# Patient Record
Sex: MALE | Race: WHITE | NOT HISPANIC OR LATINO | Employment: UNEMPLOYED | URBAN - METROPOLITAN AREA
[De-identification: names, ages, dates, MRNs, and addresses within clinical notes are randomized per-mention and may not be internally consistent; named-entity substitution may affect disease eponyms.]

---

## 2017-10-20 ENCOUNTER — APPOINTMENT (EMERGENCY)
Dept: CT IMAGING | Facility: HOSPITAL | Age: 47
DRG: 422 | End: 2017-10-20
Payer: COMMERCIAL

## 2017-10-20 ENCOUNTER — APPOINTMENT (EMERGENCY)
Dept: RADIOLOGY | Facility: HOSPITAL | Age: 47
DRG: 422 | End: 2017-10-20
Payer: COMMERCIAL

## 2017-10-20 ENCOUNTER — HOSPITAL ENCOUNTER (INPATIENT)
Facility: HOSPITAL | Age: 47
LOS: 4 days | Discharge: HOME | DRG: 422 | End: 2017-10-24
Attending: EMERGENCY MEDICINE | Admitting: ANESTHESIOLOGY
Payer: COMMERCIAL

## 2017-10-20 DIAGNOSIS — T56.894A LITHIUM TOXICITY, UNDETERMINED INTENT, INITIAL ENCOUNTER: ICD-10-CM

## 2017-10-20 DIAGNOSIS — E87.6 HYPOKALEMIA: ICD-10-CM

## 2017-10-20 DIAGNOSIS — E86.0 DEHYDRATION: ICD-10-CM

## 2017-10-20 DIAGNOSIS — E87.1 HYPONATREMIA: ICD-10-CM

## 2017-10-20 DIAGNOSIS — N17.9 AKI (ACUTE KIDNEY INJURY) (HCC): Primary | ICD-10-CM

## 2017-10-20 DIAGNOSIS — K52.9 GASTROENTERITIS: ICD-10-CM

## 2017-10-20 PROBLEM — E11.9 TYPE 2 DIABETES MELLITUS (HCC): Status: ACTIVE | Noted: 2017-10-20

## 2017-10-20 LAB
ABO GROUP BLD: NORMAL
ALBUMIN SERPL BCP-MCNC: 4.1 G/DL (ref 3.5–5)
ALP SERPL-CCNC: 112 U/L (ref 46–116)
ALT SERPL W P-5'-P-CCNC: 36 U/L (ref 12–78)
ANION GAP SERPL CALCULATED.3IONS-SCNC: 12 MMOL/L (ref 4–13)
ANION GAP SERPL CALCULATED.3IONS-SCNC: 14 MMOL/L (ref 4–13)
AST SERPL W P-5'-P-CCNC: 26 U/L (ref 5–45)
BASOPHILS # BLD AUTO: 0.03 THOUSANDS/ΜL (ref 0–0.1)
BASOPHILS NFR BLD AUTO: 0 % (ref 0–1)
BILIRUB SERPL-MCNC: 0.6 MG/DL (ref 0.2–1)
BLD GP AB SCN SERPL QL: NEGATIVE
BUN SERPL-MCNC: 44 MG/DL (ref 5–25)
BUN SERPL-MCNC: 45 MG/DL (ref 5–25)
CALCIUM SERPL-MCNC: 8.6 MG/DL (ref 8.3–10.1)
CALCIUM SERPL-MCNC: 9.6 MG/DL (ref 8.3–10.1)
CHLORIDE SERPL-SCNC: 80 MMOL/L (ref 100–108)
CHLORIDE SERPL-SCNC: 83 MMOL/L (ref 100–108)
CO2 SERPL-SCNC: 22 MMOL/L (ref 21–32)
CO2 SERPL-SCNC: 22 MMOL/L (ref 21–32)
CREAT SERPL-MCNC: 6.5 MG/DL (ref 0.6–1.3)
CREAT SERPL-MCNC: 6.73 MG/DL (ref 0.6–1.3)
EOSINOPHIL # BLD AUTO: 0.18 THOUSAND/ΜL (ref 0–0.61)
EOSINOPHIL NFR BLD AUTO: 1 % (ref 0–6)
ERYTHROCYTE [DISTWIDTH] IN BLOOD BY AUTOMATED COUNT: 12.3 % (ref 11.6–15.1)
GFR SERPL CREATININE-BSD FRML MDRD: 9 ML/MIN/1.73SQ M
GFR SERPL CREATININE-BSD FRML MDRD: 9 ML/MIN/1.73SQ M
GLUCOSE SERPL-MCNC: 73 MG/DL (ref 65–140)
GLUCOSE SERPL-MCNC: 99 MG/DL (ref 65–140)
HCT VFR BLD AUTO: 33.7 % (ref 36.5–49.3)
HGB BLD-MCNC: 12.8 G/DL (ref 12–17)
LACTATE SERPL-SCNC: 0.8 MMOL/L (ref 0.5–2)
LACTATE SERPL-SCNC: 2 MMOL/L (ref 0.5–2)
LIPASE SERPL-CCNC: 1031 U/L (ref 73–393)
LYMPHOCYTES # BLD AUTO: 2.01 THOUSANDS/ΜL (ref 0.6–4.47)
LYMPHOCYTES NFR BLD AUTO: 10 % (ref 14–44)
MAGNESIUM SERPL-MCNC: 2 MG/DL (ref 1.6–2.6)
MCH RBC QN AUTO: 29.6 PG (ref 26.8–34.3)
MCHC RBC AUTO-ENTMCNC: 38 G/DL (ref 31.4–37.4)
MCV RBC AUTO: 78 FL (ref 82–98)
MONOCYTES # BLD AUTO: 1.41 THOUSAND/ΜL (ref 0.17–1.22)
MONOCYTES NFR BLD AUTO: 7 % (ref 4–12)
NEUTROPHILS # BLD AUTO: 16.06 THOUSANDS/ΜL (ref 1.85–7.62)
NEUTS SEG NFR BLD AUTO: 81 % (ref 43–75)
NRBC BLD AUTO-RTO: 0 /100 WBCS
PLATELET # BLD AUTO: 392 THOUSANDS/UL (ref 149–390)
PLATELET # BLD AUTO: 486 THOUSANDS/UL (ref 149–390)
PMV BLD AUTO: 9.5 FL (ref 8.9–12.7)
PMV BLD AUTO: 9.7 FL (ref 8.9–12.7)
POTASSIUM SERPL-SCNC: 2.2 MMOL/L (ref 3.5–5.3)
POTASSIUM SERPL-SCNC: 2.3 MMOL/L (ref 3.5–5.3)
PROT SERPL-MCNC: 7.6 G/DL (ref 6.4–8.2)
RBC # BLD AUTO: 4.33 MILLION/UL (ref 3.88–5.62)
RH BLD: POSITIVE
SODIUM SERPL-SCNC: 116 MMOL/L (ref 136–145)
SODIUM SERPL-SCNC: 117 MMOL/L (ref 136–145)
SPECIMEN EXPIRATION DATE: NORMAL
WBC # BLD AUTO: 19.93 THOUSAND/UL (ref 4.31–10.16)

## 2017-10-20 PROCEDURE — 86850 RBC ANTIBODY SCREEN: CPT | Performed by: EMERGENCY MEDICINE

## 2017-10-20 PROCEDURE — 99285 EMERGENCY DEPT VISIT HI MDM: CPT

## 2017-10-20 PROCEDURE — 96374 THER/PROPH/DIAG INJ IV PUSH: CPT

## 2017-10-20 PROCEDURE — 83605 ASSAY OF LACTIC ACID: CPT | Performed by: EMERGENCY MEDICINE

## 2017-10-20 PROCEDURE — 83930 ASSAY OF BLOOD OSMOLALITY: CPT | Performed by: NURSE PRACTITIONER

## 2017-10-20 PROCEDURE — 96361 HYDRATE IV INFUSION ADD-ON: CPT

## 2017-10-20 PROCEDURE — 85025 COMPLETE CBC W/AUTO DIFF WBC: CPT | Performed by: EMERGENCY MEDICINE

## 2017-10-20 PROCEDURE — 80048 BASIC METABOLIC PNL TOTAL CA: CPT | Performed by: NURSE PRACTITIONER

## 2017-10-20 PROCEDURE — 86901 BLOOD TYPING SEROLOGIC RH(D): CPT | Performed by: EMERGENCY MEDICINE

## 2017-10-20 PROCEDURE — 36415 COLL VENOUS BLD VENIPUNCTURE: CPT | Performed by: EMERGENCY MEDICINE

## 2017-10-20 PROCEDURE — 94664 DEMO&/EVAL PT USE INHALER: CPT

## 2017-10-20 PROCEDURE — 96365 THER/PROPH/DIAG IV INF INIT: CPT

## 2017-10-20 PROCEDURE — 87040 BLOOD CULTURE FOR BACTERIA: CPT | Performed by: NURSE PRACTITIONER

## 2017-10-20 PROCEDURE — 96375 TX/PRO/DX INJ NEW DRUG ADDON: CPT

## 2017-10-20 PROCEDURE — 71010 HB CHEST X-RAY 1 VIEW FRONTAL (PORTABLE): CPT

## 2017-10-20 PROCEDURE — 94760 N-INVAS EAR/PLS OXIMETRY 1: CPT

## 2017-10-20 PROCEDURE — 83735 ASSAY OF MAGNESIUM: CPT | Performed by: NURSE PRACTITIONER

## 2017-10-20 PROCEDURE — 93005 ELECTROCARDIOGRAM TRACING: CPT | Performed by: EMERGENCY MEDICINE

## 2017-10-20 PROCEDURE — 83690 ASSAY OF LIPASE: CPT | Performed by: EMERGENCY MEDICINE

## 2017-10-20 PROCEDURE — 80053 COMPREHEN METABOLIC PANEL: CPT | Performed by: EMERGENCY MEDICINE

## 2017-10-20 PROCEDURE — 74176 CT ABD & PELVIS W/O CONTRAST: CPT

## 2017-10-20 PROCEDURE — 85049 AUTOMATED PLATELET COUNT: CPT | Performed by: NURSE PRACTITIONER

## 2017-10-20 PROCEDURE — 86900 BLOOD TYPING SEROLOGIC ABO: CPT | Performed by: EMERGENCY MEDICINE

## 2017-10-20 RX ORDER — LISINOPRIL 10 MG/1
10 TABLET ORAL
COMMUNITY
Start: 2016-03-03 | End: 2017-10-24 | Stop reason: HOSPADM

## 2017-10-20 RX ORDER — METFORMIN HYDROCHLORIDE 500 MG/1
1000 TABLET, EXTENDED RELEASE ORAL 2 TIMES DAILY WITH MEALS
COMMUNITY
Start: 2016-03-17

## 2017-10-20 RX ORDER — DIPHENHYDRAMINE HYDROCHLORIDE 50 MG/ML
25 INJECTION INTRAMUSCULAR; INTRAVENOUS ONCE
Status: COMPLETED | OUTPATIENT
Start: 2017-10-20 | End: 2017-10-20

## 2017-10-20 RX ORDER — LITHIUM CARBONATE 300 MG/1
300 CAPSULE ORAL 2 TIMES DAILY WITH MEALS
COMMUNITY
Start: 2016-02-27 | End: 2017-10-24 | Stop reason: HOSPADM

## 2017-10-20 RX ORDER — RISPERIDONE 3 MG/1
3 TABLET, FILM COATED ORAL
COMMUNITY
Start: 2016-02-27 | End: 2017-10-24 | Stop reason: HOSPADM

## 2017-10-20 RX ORDER — DICYCLOMINE HCL 20 MG
20 TABLET ORAL ONCE
Status: COMPLETED | OUTPATIENT
Start: 2017-10-20 | End: 2017-10-20

## 2017-10-20 RX ORDER — ATORVASTATIN CALCIUM 20 MG/1
40 TABLET, FILM COATED ORAL DAILY
COMMUNITY
Start: 2016-02-27 | End: 2017-10-24 | Stop reason: HOSPADM

## 2017-10-20 RX ORDER — PANTOPRAZOLE SODIUM 40 MG/1
40 TABLET, DELAYED RELEASE ORAL DAILY
COMMUNITY
Start: 2016-02-27

## 2017-10-20 RX ORDER — SODIUM CHLORIDE 9 MG/ML
100 INJECTION, SOLUTION INTRAVENOUS CONTINUOUS
Status: DISCONTINUED | OUTPATIENT
Start: 2017-10-20 | End: 2017-10-21

## 2017-10-20 RX ORDER — HEPARIN SODIUM 5000 [USP'U]/ML
5000 INJECTION, SOLUTION INTRAVENOUS; SUBCUTANEOUS EVERY 8 HOURS SCHEDULED
Status: DISCONTINUED | OUTPATIENT
Start: 2017-10-20 | End: 2017-10-24 | Stop reason: HOSPADM

## 2017-10-20 RX ORDER — ONDANSETRON 2 MG/ML
4 INJECTION INTRAMUSCULAR; INTRAVENOUS ONCE
Status: DISCONTINUED | OUTPATIENT
Start: 2017-10-20 | End: 2017-10-23

## 2017-10-20 RX ORDER — LIDOCAINE 50 MG/G
1 PATCH TOPICAL ONCE
Status: COMPLETED | OUTPATIENT
Start: 2017-10-20 | End: 2017-10-21

## 2017-10-20 RX ORDER — CHLORHEXIDINE GLUCONATE 0.12 MG/ML
15 RINSE ORAL EVERY 12 HOURS SCHEDULED
Status: DISCONTINUED | OUTPATIENT
Start: 2017-10-20 | End: 2017-10-22

## 2017-10-20 RX ORDER — METOCLOPRAMIDE HYDROCHLORIDE 5 MG/ML
10 INJECTION INTRAMUSCULAR; INTRAVENOUS ONCE
Status: COMPLETED | OUTPATIENT
Start: 2017-10-20 | End: 2017-10-20

## 2017-10-20 RX ORDER — ONDANSETRON 2 MG/ML
4 INJECTION INTRAMUSCULAR; INTRAVENOUS ONCE
Status: COMPLETED | OUTPATIENT
Start: 2017-10-20 | End: 2017-10-20

## 2017-10-20 RX ORDER — POTASSIUM CHLORIDE 20 MEQ/1
40 TABLET, EXTENDED RELEASE ORAL ONCE
Status: COMPLETED | OUTPATIENT
Start: 2017-10-20 | End: 2017-10-20

## 2017-10-20 RX ORDER — FENOFIBRATE 160 MG/1
160 TABLET ORAL
COMMUNITY
Start: 2016-03-15

## 2017-10-20 RX ORDER — CLOPIDOGREL BISULFATE 75 MG/1
600 TABLET ORAL ONCE
Status: DISCONTINUED | OUTPATIENT
Start: 2017-10-20 | End: 2017-10-20

## 2017-10-20 RX ORDER — POTASSIUM CHLORIDE 14.9 MG/ML
20 INJECTION INTRAVENOUS ONCE
Status: COMPLETED | OUTPATIENT
Start: 2017-10-20 | End: 2017-10-20

## 2017-10-20 RX ORDER — RISPERIDONE 2 MG/1
2 TABLET, FILM COATED ORAL
COMMUNITY
Start: 2016-02-24 | End: 2017-10-24 | Stop reason: HOSPADM

## 2017-10-20 RX ADMIN — PIPERACILLIN SODIUM,TAZOBACTAM SODIUM 2.25 G: 2; .25 INJECTION, POWDER, FOR SOLUTION INTRAVENOUS at 22:37

## 2017-10-20 RX ADMIN — POTASSIUM CHLORIDE 40 MEQ: 1500 TABLET, EXTENDED RELEASE ORAL at 18:50

## 2017-10-20 RX ADMIN — METOCLOPRAMIDE 10 MG: 5 INJECTION, SOLUTION INTRAMUSCULAR; INTRAVENOUS at 19:10

## 2017-10-20 RX ADMIN — ONDANSETRON 4 MG: 2 INJECTION INTRAMUSCULAR; INTRAVENOUS at 18:51

## 2017-10-20 RX ADMIN — ONDANSETRON 4 MG: 2 INJECTION INTRAMUSCULAR; INTRAVENOUS at 17:44

## 2017-10-20 RX ADMIN — SODIUM CHLORIDE 1000 ML: 0.9 INJECTION, SOLUTION INTRAVENOUS at 17:40

## 2017-10-20 RX ADMIN — POTASSIUM CHLORIDE 20 MEQ: 200 INJECTION, SOLUTION INTRAVENOUS at 18:50

## 2017-10-20 RX ADMIN — SODIUM CHLORIDE 100 ML/HR: 0.9 INJECTION, SOLUTION INTRAVENOUS at 22:35

## 2017-10-20 RX ADMIN — LIDOCAINE 1 PATCH: 50 PATCH CUTANEOUS at 22:52

## 2017-10-20 RX ADMIN — DIPHENHYDRAMINE HYDROCHLORIDE 25 MG: 50 INJECTION, SOLUTION INTRAMUSCULAR; INTRAVENOUS at 19:11

## 2017-10-20 RX ADMIN — CHLORHEXIDINE GLUCONATE 15 ML: 1.2 RINSE ORAL at 22:53

## 2017-10-20 RX ADMIN — VANCOMYCIN HYDROCHLORIDE 2000 MG: 1 INJECTION, POWDER, LYOPHILIZED, FOR SOLUTION INTRAVENOUS at 23:56

## 2017-10-20 RX ADMIN — METRONIDAZOLE 500 MG: 500 INJECTION, SOLUTION INTRAVENOUS at 23:50

## 2017-10-20 RX ADMIN — DICYCLOMINE HYDROCHLORIDE 20 MG: 20 TABLET ORAL at 17:55

## 2017-10-20 RX ADMIN — HEPARIN SODIUM 5000 UNITS: 5000 INJECTION, SOLUTION INTRAVENOUS; SUBCUTANEOUS at 22:53

## 2017-10-20 NOTE — ED PROVIDER NOTES
History  Chief Complaint   Patient presents with    Diarrhea     Pt states he has had nausea and diarrhea for a few days, had blood work done which showed elevated lipase   Neck Pain     Pt c/o weakness, fatigue and neck pain for a few days  Pt unable having difficulty keeping eyes open in triage, mumbling answers  44-year-old male presents the emergency department in distress  He is a diabetic male and his only other medical history is psychiatric disorder (schizophrenia, depression)  He is presenting to the emergency department after 10 days of GI illness  He has had severe diarrhea and vomiting for the past 10 days and is now presenting feeling very unwell  He is very dehydrated, he is having abdominal tenderness secondary to the GI illness  He is pale, hypotensive on arrival   He denies history of similar in the past   He denies fevers or chills  He denies any chest pain or shortness of breath  He denies dysuria but admits to decreased urine output  None       Past Medical History:   Diagnosis Date    Diabetes mellitus ([facility] [address] )     Psychiatric disorder     schizophrenia, depression       Past Surgical History:   Procedure Laterality Date    UPPER GASTROINTESTINAL ENDOSCOPY         Family History   Problem Relation Age of Onset    No Known Problems Mother     No Known Problems Father      I have reviewed and agree with the history as documented  Social History   Substance Use Topics    Smoking status: Never Smoker    Smokeless tobacco: Not on file    Alcohol use No        Review of Systems   Unable to perform ROS: Acuity of condition   Constitutional: Positive for appetite change and fatigue  Negative for chills and fever  HENT: Negative for congestion and sore throat  Respiratory: Negative for cough, chest tightness and shortness of breath  Cardiovascular: Negative for chest pain, palpitations and leg swelling     Gastrointestinal: Positive for abdominal pain, diarrhea, nausea and vomiting  Negative for blood in stool (dark stool, and liquidy green stool), constipation and rectal pain  Genitourinary: Positive for decreased urine volume  Negative for dysuria and flank pain  Musculoskeletal: Positive for myalgias  Negative for back pain, neck pain (neck muscle tenderness) and neck stiffness  Skin: Negative for color change and rash  Allergic/Immunologic: Negative for immunocompromised state  DM   Neurological: Positive for weakness  Negative for dizziness, syncope and headaches  Psychiatric/Behavioral: Negative for confusion  Physical Exam  ED Triage Vitals [10/20/17 1717]   Temperature Pulse Respirations Blood Pressure SpO2   (!) 97 2 °F (36 2 °C) 71 16 (!) 82/40 92 %      Temp Source Heart Rate Source Patient Position - Orthostatic VS BP Location FiO2 (%)   Oral Monitor Sitting Left arm --      Pain Score       Worst Possible Pain           Physical Exam   Constitutional: He is oriented to person, place, and time  He appears well-developed and well-nourished  He appears distressed  HENT:   Head: Normocephalic and atraumatic  Oropharynx clear but dry    Eyes: Conjunctivae and EOM are normal  Pupils are equal, round, and reactive to light  Right eye exhibits no discharge  Left eye exhibits no discharge  No scleral icterus  Sunken eyes (dehydrated)   Neck: Normal range of motion  Neck supple  No JVD present  Paraspinal neck muscle hypertonicity   Cardiovascular: Normal rate, regular rhythm, normal heart sounds and intact distal pulses  Exam reveals no gallop and no friction rub  No murmur heard  Pulmonary/Chest: Effort normal and breath sounds normal  No respiratory distress  He has no wheezes  He has no rales  He exhibits no tenderness  Abdominal: Soft  He exhibits no distension  There is tenderness (Diffuse)  There is no guarding  Hyper active bowel sounds  Negative guaiac  Musculoskeletal: Normal range of motion   He exhibits tenderness ( diffuse myalgias)  He exhibits no edema or deformity  Lymphadenopathy:     He has no cervical adenopathy  Neurological: He is oriented to person, place, and time  No cranial nerve deficit  Fatigued but arousable   Skin: No rash noted  He is diaphoretic  No erythema  There is pallor  Psychiatric: He has a normal mood and affect  His behavior is normal    Vitals reviewed        ED Medications  Medications   ondansetron (ZOFRAN) injection 4 mg (0 mg Intravenous Hold 10/20/17 1830)   sodium chloride 0 9 % infusion (not administered)   chlorhexidine (PERIDEX) 0 12 % oral rinse 15 mL (not administered)   heparin (porcine) subcutaneous injection 5,000 Units (not administered)   vancomycin (VANCOCIN) 1,500 mg in sodium chloride 0 9 % 250 mL IVPB (not administered)   piperacillin-tazobactam (ZOSYN) 3 375 g in sodium chloride 0 9 % 50 mL IVPB (not administered)   metroNIDAZOLE (FLAGYL) IVPB (premix) 500 mg (not administered)   lidocaine (LIDODERM) 5 % patch 1 patch (not administered)   ondansetron (ZOFRAN) injection 4 mg (4 mg Intravenous Given 10/20/17 1744)   dicyclomine (BENTYL) tablet 20 mg (20 mg Oral Given 10/20/17 1755)   sodium chloride 0 9 % bolus 1,000 mL (0 mL Intravenous Stopped 10/20/17 2120)   sodium chloride 0 9 % bolus 1,000 mL (0 mL Intravenous Stopped 10/20/17 2120)   potassium chloride 20 mEq IVPB (premix) (0 mEq Intravenous Stopped 10/20/17 2123)   potassium chloride (K-DUR,KLOR-CON) CR tablet 40 mEq (40 mEq Oral Given 10/20/17 1850)   ondansetron (ZOFRAN) injection 4 mg (4 mg Intravenous Given 10/20/17 1851)   metoclopramide (REGLAN) injection 10 mg (10 mg Intravenous Given 10/20/17 1910)   diphenhydrAMINE (BENADRYL) injection 25 mg (25 mg Intravenous Given 10/20/17 1911)       Diagnostic Studies  Labs Reviewed   CBC AND DIFFERENTIAL - Abnormal        Result Value Ref Range Status    WBC 19 93 (*) 4 31 - 10 16 Thousand/uL Final    Hematocrit 33 7 (*) 36 5 - 49 3 % Final    MCV 78 (*) 82 - 98 fL Final    MCHC 38 0 (*) 31 4 - 37 4 g/dL Final    Platelets 324 (*) 966 - 390 Thousands/uL Final    Neutrophils Relative 81 (*) 43 - 75 % Final    Lymphocytes Relative 10 (*) 14 - 44 % Final    Neutrophils Absolute 16 06 (*) 1 85 - 7 62 Thousands/µL Final    Monocytes Absolute 1 41 (*) 0 17 - 1 22 Thousand/µL Final    RBC 4 33  3 88 - 5 62 Million/uL Final    Hemoglobin 12 8  12 0 - 17 0 g/dL Final    MCH 29 6  26 8 - 34 3 pg Final    RDW 12 3  11 6 - 15 1 % Final    MPV 9 7  8 9 - 12 7 fL Final    nRBC 0  /100 WBCs Final    Monocytes Relative 7  4 - 12 % Final    Eosinophils Relative 1  0 - 6 % Final    Basophils Relative 0  0 - 1 % Final    Lymphocytes Absolute 2 01  0 60 - 4 47 Thousands/µL Final    Eosinophils Absolute 0 18  0 00 - 0 61 Thousand/µL Final    Basophils Absolute 0 03  0 00 - 0 10 Thousands/µL Final   COMPREHENSIVE METABOLIC PANEL - Abnormal     Sodium 116 (*) 136 - 145 mmol/L Final    Potassium 2 2 (*) 3 5 - 5 3 mmol/L Final    Chloride 80 (*) 100 - 108 mmol/L Final    Anion Gap 14 (*) 4 - 13 mmol/L Final    BUN 44 (*) 5 - 25 mg/dL Final    Creatinine 6 50 (*) 0 60 - 1 30 mg/dL Final    Comment: Standardized to IDMS reference method    CO2 22  21 - 32 mmol/L Final    Glucose 99  65 - 140 mg/dL Final    Comment:   If the patient is fasting, the ADA then defines impaired fasting glucose as > 100 mg/dL and diabetes as > or equal to 123 mg/dL  Specimen collection should occur prior to Sulfasalazine administration due to the potential for falsely depressed results  Specimen collection should occur prior to Sulfapyridine administration due to the potential for falsely elevated results  Calcium 9 6  8 3 - 10 1 mg/dL Final    AST 26  5 - 45 U/L Final    Comment:   Specimen collection should occur prior to Sulfasalazine administration due to the potential for falsely depressed results       ALT 36  12 - 78 U/L Final    Comment:   Specimen collection should occur prior to Sulfasalazine administration due to the potential for falsely depressed results  Alkaline Phosphatase 112  46 - 116 U/L Final    Total Protein 7 6  6 4 - 8 2 g/dL Final    Albumin 4 1  3 5 - 5 0 g/dL Final    Total Bilirubin 0 60  0 20 - 1 00 mg/dL Final    eGFR 9  ml/min/1 73sq m Final    Narrative:     National Kidney Disease Education Program recommendations are as follows:  GFR calculation is accurate only with a steady state creatinine  Chronic Kidney disease less than 60 ml/min/1 73 sq  meters  Kidney failure less than 15 ml/min/1 73 sq  meters  LIPASE - Abnormal     Lipase 1,031 (*) 73 - 393 u/L Final   LACTIC ACID, PLASMA - Normal    LACTIC ACID 2 0  0 5 - 2 0 mmol/L Final    Narrative:     Result may be elevated if tourniquet was used during collection  CLOSTRIDIUM DIFFICILE TOXIN BY PCR   BLOOD CULTURE   BLOOD CULTURE   STOOL ENTERIC BACTERIAL PANEL BY PCR   OVA AND PARASITE EXAMINATION   UA W REFLEX TO MICROSCOPIC WITH REFLEX TO CULTURE   LACTIC ACID, PLASMA   BASIC METABOLIC PANEL   PLATELET COUNT   BASIC METABOLIC PANEL   BASIC METABOLIC PANEL   MAGNESIUM   MAGNESIUM   POTASSIUM, URINE, RANDOM   CHLORIDE, URINE, RANDOM   OSMOLALITY, URINE   OSMOLALITY   SODIUM, URINE, RANDOM   TYPE AND SCREEN    ABO Grouping O   Final    Rh Factor Positive   Final    Antibody Screen Negative   Final    Specimen Expiration Date [id number]   Final       CT abdomen pelvis wo contrast   ED Interpretation       Multiple nondilated fluid-filled loops of lower abdominal small   bowel with liquid stool noted throughout the colon suggestive of   a gastroenteritis/diarrheal disease  There is no significant   colonic wall thickening or pericolonic inflammatory stranding        No evidence of large or small bowel obstruction        No free air or free fluid        Small partially visualized bilateral pleural effusions with   posterior bibasilar atelectasis        Final Result      Multiple nondilated fluid-filled loops of lower abdominal small bowel with liquid stool noted throughout the colon suggestive of a gastroenteritis/diarrheal disease  There is no significant colonic wall thickening or pericolonic inflammatory stranding  No evidence of large or small bowel obstruction  No free air or free fluid  Small partially visualized bilateral pleural effusions with posterior bibasilar atelectasis  Workstation performed: [id number]         XR chest 1 view portable    (Results Pending)       Procedures  ECG 12 Lead Documentation  Date/Time: 10/20/2017 5:37 PM  Performed by: Scherrie Schilder by: Jordon Quesada     Indications / Diagnosis:  Weakness  ECG reviewed by me, the ED Provider: yes    Patient location:  ED  Previous ECG:     Previous ECG:  Unavailable    Comparison to cardiac monitor: Yes    Interpretation:     Interpretation: non-specific    Rate:     ECG rate:  62    ECG rate assessment: normal    Rhythm:     Rhythm: sinus rhythm and A-V block    Ectopy:     Ectopy: none    QRS:     QRS axis:  Normal    QRS intervals:  Normal  Conduction:     Conduction: normal    ST segments:     ST segments:  Non-specific  T waves:     T waves: normal    Comments:      Nonspecific intraventricular conduction block  Nonspecific ST variation  Phone Contacts  ED Phone Contact    ED Course  ED Course as of Oct 20 2151   Fri Oct 20, 2017   1819 Lipase: (!) 1,031   1819 WBC: (!) 19 93   1820 WBC: (!) 19 93   1821 Getting IVF Blood Pressure: (!) 98/40   1821 Still w nausea  Will give more zofran    1823 Potassium: (!!) 2 2   1823 Sodium: (!) 116   1823 Creatinine: (!) 6 50   1828 Advised patient that he has an acute kidney injury  He has no history of dialysis in the past   He will therefore be  having a dry CT scan performed  Still nauseous, will require more Zofran      4752 Patient still nauseated - will give [address] advises this patient needs ICU                                MDM  Number of Diagnoses or Management Options  DARIN (acute kidney injury) ([facility] ):   Dehydration:   Gastroenteritis:   Hypokalemia:   Hyponatremia:   Diagnosis management comments: Severe GI illness and severe dehydration  Will do labs and scan abd   resus and symptomatic management  This patient is very sick  He has an acute kidney injury, he is severely dehydrated likely secondary to his GI illness  As such he has hypo kalemia, hyponatremia  Will admit to the ICU for further care  CritCare Time    Disposition  Final diagnoses:   DARIN (acute kidney injury) ([facility] )   Dehydration   Hypokalemia   Hyponatremia   Gastroenteritis     ED Disposition     ED Disposition Condition Comment    Admit  Case was discussed with Gwen (CC) and the patient's admission status was agreed to be Admission Status: inpatient status to the service of Dr Allegra Oconnor (CC)   Follow-up Information    None       Patient's Medications    No medications on file     No discharge procedures on file      ED Provider  Electronically Signed by       Beryl Montemayor DO  10/20/17 3946

## 2017-10-21 ENCOUNTER — APPOINTMENT (INPATIENT)
Dept: ULTRASOUND IMAGING | Facility: HOSPITAL | Age: 47
DRG: 422 | End: 2017-10-21
Payer: COMMERCIAL

## 2017-10-21 PROBLEM — T56.891A LITHIUM TOXICITY: Status: ACTIVE | Noted: 2017-10-21

## 2017-10-21 PROBLEM — N17.9 AKI (ACUTE KIDNEY INJURY) (HCC): Status: ACTIVE | Noted: 2017-10-21

## 2017-10-21 LAB
ALBUMIN SERPL BCP-MCNC: 2.9 G/DL (ref 3.5–5)
ALP SERPL-CCNC: 98 U/L (ref 46–116)
ALT SERPL W P-5'-P-CCNC: 28 U/L (ref 12–78)
AMORPH URATE CRY URNS QL MICRO: ABNORMAL /HPF
AMPHETAMINES SERPL QL SCN: NEGATIVE
AMYLASE SERPL-CCNC: 54 IU/L (ref 25–115)
ANION GAP SERPL CALCULATED.3IONS-SCNC: 11 MMOL/L (ref 4–13)
ANION GAP SERPL CALCULATED.3IONS-SCNC: 11 MMOL/L (ref 4–13)
ANION GAP SERPL CALCULATED.3IONS-SCNC: 12 MMOL/L (ref 4–13)
ANION GAP SERPL CALCULATED.3IONS-SCNC: 13 MMOL/L (ref 4–13)
ANION GAP SERPL CALCULATED.3IONS-SCNC: 13 MMOL/L (ref 4–13)
ANION GAP SERPL CALCULATED.3IONS-SCNC: 9 MMOL/L (ref 4–13)
ANION GAP SERPL CALCULATED.3IONS-SCNC: 9 MMOL/L (ref 4–13)
APAP SERPL-MCNC: <2 UG/ML (ref 10–30)
AST SERPL W P-5'-P-CCNC: 20 U/L (ref 5–45)
BACTERIA UR QL AUTO: ABNORMAL /HPF
BARBITURATES UR QL: NEGATIVE
BASE EX.OXY STD BLDV CALC-SCNC: 87.4 % (ref 60–80)
BASE EX.OXY STD BLDV CALC-SCNC: 95.9 % (ref 60–80)
BASE EXCESS BLDV CALC-SCNC: -7.2 MMOL/L
BASE EXCESS BLDV CALC-SCNC: -8.7 MMOL/L
BASOPHILS # BLD AUTO: 0.03 THOUSANDS/ΜL (ref 0–0.1)
BASOPHILS NFR BLD AUTO: 0 % (ref 0–1)
BENZODIAZ UR QL: NEGATIVE
BILIRUB DIRECT SERPL-MCNC: 0.2 MG/DL (ref 0–0.2)
BILIRUB SERPL-MCNC: 0.6 MG/DL (ref 0.2–1)
BILIRUB UR QL STRIP: NEGATIVE
BUN SERPL-MCNC: 37 MG/DL (ref 5–25)
BUN SERPL-MCNC: 41 MG/DL (ref 5–25)
BUN SERPL-MCNC: 44 MG/DL (ref 5–25)
BUN SERPL-MCNC: 45 MG/DL (ref 5–25)
BUN SERPL-MCNC: 45 MG/DL (ref 5–25)
BUN SERPL-MCNC: 46 MG/DL (ref 5–25)
BUN SERPL-MCNC: 46 MG/DL (ref 5–25)
C DIFF TOX GENS STL QL NAA+PROBE: NORMAL
CA-I BLD-SCNC: 1.12 MMOL/L (ref 1.12–1.32)
CA-I BLD-SCNC: 1.14 MMOL/L (ref 1.12–1.32)
CALCIUM SERPL-MCNC: 7.8 MG/DL (ref 8.3–10.1)
CALCIUM SERPL-MCNC: 7.9 MG/DL (ref 8.3–10.1)
CALCIUM SERPL-MCNC: 8 MG/DL (ref 8.3–10.1)
CALCIUM SERPL-MCNC: 8.2 MG/DL (ref 8.3–10.1)
CALCIUM SERPL-MCNC: 8.5 MG/DL (ref 8.3–10.1)
CAMPYLOBACTER DNA SPEC NAA+PROBE: NORMAL
CHLORIDE SERPL-SCNC: 84 MMOL/L (ref 100–108)
CHLORIDE SERPL-SCNC: 86 MMOL/L (ref 100–108)
CHLORIDE SERPL-SCNC: 89 MMOL/L (ref 100–108)
CHLORIDE SERPL-SCNC: 90 MMOL/L (ref 100–108)
CHLORIDE SERPL-SCNC: 91 MMOL/L (ref 100–108)
CHLORIDE UR-SCNC: 11 MMOL/L (ref 10–330)
CHLORIDE UR-SCNC: <10 MMOL/L (ref 10–330)
CLARITY UR: ABNORMAL
CO2 SERPL-SCNC: 19 MMOL/L (ref 21–32)
CO2 SERPL-SCNC: 20 MMOL/L (ref 21–32)
CO2 SERPL-SCNC: 21 MMOL/L (ref 21–32)
CO2 SERPL-SCNC: 22 MMOL/L (ref 21–32)
COCAINE UR QL: NEGATIVE
COLOR UR: YELLOW
CREAT SERPL-MCNC: 4.16 MG/DL (ref 0.6–1.3)
CREAT SERPL-MCNC: 5 MG/DL (ref 0.6–1.3)
CREAT SERPL-MCNC: 6.09 MG/DL (ref 0.6–1.3)
CREAT SERPL-MCNC: 6.67 MG/DL (ref 0.6–1.3)
CREAT SERPL-MCNC: 6.7 MG/DL (ref 0.6–1.3)
CREAT SERPL-MCNC: 6.7 MG/DL (ref 0.6–1.3)
CREAT SERPL-MCNC: 6.83 MG/DL (ref 0.6–1.3)
EOSINOPHIL # BLD AUTO: 0.25 THOUSAND/ΜL (ref 0–0.61)
EOSINOPHIL NFR BLD AUTO: 1 % (ref 0–6)
EOSINOPHIL NFR URNS MANUAL: 0 %
ERYTHROCYTE [DISTWIDTH] IN BLOOD BY AUTOMATED COUNT: 12.2 % (ref 11.6–15.1)
ETHANOL SERPL-MCNC: <3 MG/DL (ref 0–3)
FINE GRAN CASTS URNS QL MICRO: ABNORMAL /LPF
GFR SERPL CREATININE-BSD FRML MDRD: 10 ML/MIN/1.73SQ M
GFR SERPL CREATININE-BSD FRML MDRD: 13 ML/MIN/1.73SQ M
GFR SERPL CREATININE-BSD FRML MDRD: 16 ML/MIN/1.73SQ M
GFR SERPL CREATININE-BSD FRML MDRD: 9 ML/MIN/1.73SQ M
GLUCOSE P FAST SERPL-MCNC: 98 MG/DL (ref 65–99)
GLUCOSE SERPL-MCNC: 104 MG/DL (ref 65–140)
GLUCOSE SERPL-MCNC: 111 MG/DL (ref 65–140)
GLUCOSE SERPL-MCNC: 118 MG/DL (ref 65–140)
GLUCOSE SERPL-MCNC: 120 MG/DL (ref 65–140)
GLUCOSE SERPL-MCNC: 156 MG/DL (ref 65–140)
GLUCOSE SERPL-MCNC: 159 MG/DL (ref 65–140)
GLUCOSE SERPL-MCNC: 70 MG/DL (ref 65–140)
GLUCOSE SERPL-MCNC: 77 MG/DL (ref 65–140)
GLUCOSE SERPL-MCNC: 79 MG/DL (ref 65–140)
GLUCOSE SERPL-MCNC: 80 MG/DL (ref 65–140)
GLUCOSE SERPL-MCNC: 81 MG/DL (ref 65–140)
GLUCOSE SERPL-MCNC: 98 MG/DL (ref 65–140)
GLUCOSE UR STRIP-MCNC: ABNORMAL MG/DL
HCO3 BLDV-SCNC: 16.6 MMOL/L (ref 24–30)
HCO3 BLDV-SCNC: 18.6 MMOL/L (ref 24–30)
HCT VFR BLD AUTO: 31.8 % (ref 36.5–49.3)
HGB BLD-MCNC: 11.8 G/DL (ref 12–17)
HGB UR QL STRIP.AUTO: ABNORMAL
KETONES UR STRIP-MCNC: NEGATIVE MG/DL
LEUKOCYTE ESTERASE UR QL STRIP: NEGATIVE
LIPASE SERPL-CCNC: 792 U/L (ref 73–393)
LITHIUM SERPL-SCNC: 1.6 MMOL/L (ref 0.5–1)
LITHIUM SERPL-SCNC: 1.7 MMOL/L (ref 0.5–1)
LITHIUM SERPL-SCNC: 1.8 MMOL/L (ref 0.5–1)
LYMPHOCYTES # BLD AUTO: 1.78 THOUSANDS/ΜL (ref 0.6–4.47)
LYMPHOCYTES NFR BLD AUTO: 10 % (ref 14–44)
MAGNESIUM SERPL-MCNC: 1.7 MG/DL (ref 1.6–2.6)
MAGNESIUM SERPL-MCNC: 1.8 MG/DL (ref 1.6–2.6)
MAGNESIUM SERPL-MCNC: 1.9 MG/DL (ref 1.6–2.6)
MAGNESIUM SERPL-MCNC: 2 MG/DL (ref 1.6–2.6)
MAGNESIUM SERPL-MCNC: 2.1 MG/DL (ref 1.6–2.6)
MCH RBC QN AUTO: 29.6 PG (ref 26.8–34.3)
MCHC RBC AUTO-ENTMCNC: 37.1 G/DL (ref 31.4–37.4)
MCV RBC AUTO: 80 FL (ref 82–98)
METHADONE UR QL: NEGATIVE
MONOCYTES # BLD AUTO: 1.46 THOUSAND/ΜL (ref 0.17–1.22)
MONOCYTES NFR BLD AUTO: 8 % (ref 4–12)
NEUTROPHILS # BLD AUTO: 13.56 THOUSANDS/ΜL (ref 1.85–7.62)
NEUTS SEG NFR BLD AUTO: 79 % (ref 43–75)
NITRITE UR QL STRIP: NEGATIVE
NON-SQ EPI CELLS URNS QL MICRO: ABNORMAL /HPF
NRBC BLD AUTO-RTO: 0 /100 WBCS
O2 CT BLDV-SCNC: 14 ML/DL
O2 CT BLDV-SCNC: 16.3 ML/DL
OPIATES UR QL SCN: NEGATIVE
OSMOLALITY UR/SERPL-RTO: 260 MMOL/KG (ref 282–298)
OSMOLALITY UR: 245 MMOL/KG
OSMOLALITY UR: 263 MMOL/KG
PCO2 BLDV: 33.7 MM HG (ref 42–50)
PCO2 BLDV: 38.6 MM HG (ref 42–50)
PCP UR QL: NEGATIVE
PH BLDV: 7.3 [PH] (ref 7.3–7.4)
PH BLDV: 7.31 [PH] (ref 7.3–7.4)
PH UR STRIP.AUTO: 5 [PH] (ref 4.5–8)
PLATELET # BLD AUTO: 401 THOUSANDS/UL (ref 149–390)
PMV BLD AUTO: 9.7 FL (ref 8.9–12.7)
PO2 BLDV: 100.3 MM HG (ref 35–45)
PO2 BLDV: 53.8 MM HG (ref 35–45)
POTASSIUM SERPL-SCNC: 2.2 MMOL/L (ref 3.5–5.3)
POTASSIUM SERPL-SCNC: 2.8 MMOL/L (ref 3.5–5.3)
POTASSIUM SERPL-SCNC: 3 MMOL/L (ref 3.5–5.3)
POTASSIUM SERPL-SCNC: 3.1 MMOL/L (ref 3.5–5.3)
POTASSIUM SERPL-SCNC: 3.1 MMOL/L (ref 3.5–5.3)
POTASSIUM SERPL-SCNC: 3.3 MMOL/L (ref 3.5–5.3)
POTASSIUM SERPL-SCNC: 3.7 MMOL/L (ref 3.5–5.3)
POTASSIUM UR-SCNC: 8.4 MMOL/L (ref 1–300)
PROT SERPL-MCNC: 5.7 G/DL (ref 6.4–8.2)
PROT UR STRIP-MCNC: ABNORMAL MG/DL
RBC # BLD AUTO: 3.99 MILLION/UL (ref 3.88–5.62)
RBC #/AREA URNS AUTO: ABNORMAL /HPF
SALICYLATES SERPL-MCNC: <3 MG/DL (ref 3–20)
SALMONELLA DNA SPEC QL NAA+PROBE: NORMAL
SHIGA TOXIN STX GENE SPEC NAA+PROBE: NORMAL
SHIGELLA DNA SPEC QL NAA+PROBE: NORMAL
SODIUM 24H UR-SCNC: 10 MOL/L
SODIUM 24H UR-SCNC: 14 MOL/L
SODIUM SERPL-SCNC: 118 MMOL/L (ref 136–145)
SODIUM SERPL-SCNC: 119 MMOL/L (ref 136–145)
SODIUM SERPL-SCNC: 120 MMOL/L (ref 136–145)
SODIUM SERPL-SCNC: 121 MMOL/L (ref 136–145)
SODIUM SERPL-SCNC: 121 MMOL/L (ref 136–145)
SP GR UR STRIP.AUTO: >=1.03 (ref 1–1.03)
THC UR QL: NEGATIVE
TSH SERPL DL<=0.05 MIU/L-ACNC: 1.88 UIU/ML (ref 0.36–3.74)
UROBILINOGEN UR QL STRIP.AUTO: 0.2 E.U./DL
UUN 24H UR-MCNC: 273 MG/DL
WBC # BLD AUTO: 17.28 THOUSAND/UL (ref 4.31–10.16)
WBC #/AREA URNS AUTO: ABNORMAL /HPF
WBC CASTS URNS QL MICRO: ABNORMAL /LPF

## 2017-10-21 PROCEDURE — 80178 ASSAY OF LITHIUM: CPT | Performed by: NURSE PRACTITIONER

## 2017-10-21 PROCEDURE — 84300 ASSAY OF URINE SODIUM: CPT | Performed by: INTERNAL MEDICINE

## 2017-10-21 PROCEDURE — 83690 ASSAY OF LIPASE: CPT | Performed by: NURSE PRACTITIONER

## 2017-10-21 PROCEDURE — 84133 ASSAY OF URINE POTASSIUM: CPT | Performed by: NURSE PRACTITIONER

## 2017-10-21 PROCEDURE — 83735 ASSAY OF MAGNESIUM: CPT | Performed by: NURSE PRACTITIONER

## 2017-10-21 PROCEDURE — 82948 REAGENT STRIP/BLOOD GLUCOSE: CPT

## 2017-10-21 PROCEDURE — 76770 US EXAM ABDO BACK WALL COMP: CPT

## 2017-10-21 PROCEDURE — 84540 ASSAY OF URINE/UREA-N: CPT | Performed by: INTERNAL MEDICINE

## 2017-10-21 PROCEDURE — 85025 COMPLETE CBC W/AUTO DIFF WBC: CPT | Performed by: NURSE PRACTITIONER

## 2017-10-21 PROCEDURE — 87205 SMEAR GRAM STAIN: CPT | Performed by: INTERNAL MEDICINE

## 2017-10-21 PROCEDURE — 82805 BLOOD GASES W/O2 SATURATION: CPT | Performed by: NURSE PRACTITIONER

## 2017-10-21 PROCEDURE — 82436 ASSAY OF URINE CHLORIDE: CPT | Performed by: NURSE PRACTITIONER

## 2017-10-21 PROCEDURE — 83935 ASSAY OF URINE OSMOLALITY: CPT | Performed by: INTERNAL MEDICINE

## 2017-10-21 PROCEDURE — 87209 SMEAR COMPLEX STAIN: CPT | Performed by: NURSE PRACTITIONER

## 2017-10-21 PROCEDURE — 87505 NFCT AGENT DETECTION GI: CPT | Performed by: NURSE PRACTITIONER

## 2017-10-21 PROCEDURE — 80329 ANALGESICS NON-OPIOID 1 OR 2: CPT | Performed by: NURSE PRACTITIONER

## 2017-10-21 PROCEDURE — 80320 DRUG SCREEN QUANTALCOHOLS: CPT | Performed by: NURSE PRACTITIONER

## 2017-10-21 PROCEDURE — 80048 BASIC METABOLIC PNL TOTAL CA: CPT | Performed by: NURSE PRACTITIONER

## 2017-10-21 PROCEDURE — 87086 URINE CULTURE/COLONY COUNT: CPT | Performed by: EMERGENCY MEDICINE

## 2017-10-21 PROCEDURE — 83935 ASSAY OF URINE OSMOLALITY: CPT | Performed by: NURSE PRACTITIONER

## 2017-10-21 PROCEDURE — 80076 HEPATIC FUNCTION PANEL: CPT | Performed by: NURSE PRACTITIONER

## 2017-10-21 PROCEDURE — 80307 DRUG TEST PRSMV CHEM ANLYZR: CPT | Performed by: NURSE PRACTITIONER

## 2017-10-21 PROCEDURE — 87493 C DIFF AMPLIFIED PROBE: CPT | Performed by: NURSE PRACTITIONER

## 2017-10-21 PROCEDURE — 82436 ASSAY OF URINE CHLORIDE: CPT | Performed by: INTERNAL MEDICINE

## 2017-10-21 PROCEDURE — 81001 URINALYSIS AUTO W/SCOPE: CPT | Performed by: EMERGENCY MEDICINE

## 2017-10-21 PROCEDURE — 84443 ASSAY THYROID STIM HORMONE: CPT | Performed by: NURSE PRACTITIONER

## 2017-10-21 PROCEDURE — 87177 OVA AND PARASITES SMEARS: CPT | Performed by: NURSE PRACTITIONER

## 2017-10-21 PROCEDURE — 84300 ASSAY OF URINE SODIUM: CPT | Performed by: NURSE PRACTITIONER

## 2017-10-21 PROCEDURE — 80048 BASIC METABOLIC PNL TOTAL CA: CPT | Performed by: EMERGENCY MEDICINE

## 2017-10-21 PROCEDURE — 82330 ASSAY OF CALCIUM: CPT | Performed by: NURSE PRACTITIONER

## 2017-10-21 PROCEDURE — 82150 ASSAY OF AMYLASE: CPT | Performed by: NURSE PRACTITIONER

## 2017-10-21 RX ORDER — POTASSIUM CHLORIDE 14.9 MG/ML
20 INJECTION INTRAVENOUS
Status: COMPLETED | OUTPATIENT
Start: 2017-10-21 | End: 2017-10-21

## 2017-10-21 RX ORDER — POTASSIUM CHLORIDE 20MEQ/15ML
40 LIQUID (ML) ORAL ONCE
Status: COMPLETED | OUTPATIENT
Start: 2017-10-21 | End: 2017-10-21

## 2017-10-21 RX ORDER — POTASSIUM CHLORIDE AND SODIUM CHLORIDE 900; 300 MG/100ML; MG/100ML
100 INJECTION, SOLUTION INTRAVENOUS CONTINUOUS
Status: DISCONTINUED | OUTPATIENT
Start: 2017-10-21 | End: 2017-10-21

## 2017-10-21 RX ORDER — POTASSIUM CHLORIDE 14.9 MG/ML
20 INJECTION INTRAVENOUS ONCE
Status: COMPLETED | OUTPATIENT
Start: 2017-10-21 | End: 2017-10-21

## 2017-10-21 RX ORDER — POTASSIUM CHLORIDE 29.8 MG/ML
40 INJECTION INTRAVENOUS ONCE
Status: DISCONTINUED | OUTPATIENT
Start: 2017-10-21 | End: 2017-10-21 | Stop reason: SDUPTHER

## 2017-10-21 RX ORDER — CITALOPRAM 40 MG/1
40 TABLET ORAL DAILY
COMMUNITY
End: 2017-10-24 | Stop reason: HOSPADM

## 2017-10-21 RX ORDER — POTASSIUM CHLORIDE 20MEQ/15ML
40 LIQUID (ML) ORAL ONCE
Status: DISCONTINUED | OUTPATIENT
Start: 2017-10-21 | End: 2017-10-23

## 2017-10-21 RX ORDER — POTASSIUM CHLORIDE 14.9 MG/ML
20 INJECTION INTRAVENOUS
Status: DISCONTINUED | OUTPATIENT
Start: 2017-10-21 | End: 2017-10-21

## 2017-10-21 RX ORDER — MAGNESIUM SULFATE HEPTAHYDRATE 40 MG/ML
2 INJECTION, SOLUTION INTRAVENOUS ONCE
Status: COMPLETED | OUTPATIENT
Start: 2017-10-21 | End: 2017-10-21

## 2017-10-21 RX ADMIN — PIPERACILLIN SODIUM,TAZOBACTAM SODIUM 2.25 G: 2; .25 INJECTION, POWDER, FOR SOLUTION INTRAVENOUS at 21:50

## 2017-10-21 RX ADMIN — POTASSIUM CHLORIDE 20 MEQ: 200 INJECTION, SOLUTION INTRAVENOUS at 00:26

## 2017-10-21 RX ADMIN — HEPARIN SODIUM 5000 UNITS: 5000 INJECTION, SOLUTION INTRAVENOUS; SUBCUTANEOUS at 05:40

## 2017-10-21 RX ADMIN — HEPARIN SODIUM 5000 UNITS: 5000 INJECTION, SOLUTION INTRAVENOUS; SUBCUTANEOUS at 13:44

## 2017-10-21 RX ADMIN — POTASSIUM CHLORIDE 20 MEQ: 200 INJECTION, SOLUTION INTRAVENOUS at 17:15

## 2017-10-21 RX ADMIN — MAGNESIUM SULFATE HEPTAHYDRATE 2 G: 40 INJECTION, SOLUTION INTRAVENOUS at 14:43

## 2017-10-21 RX ADMIN — SODIUM CHLORIDE, SODIUM LACTATE, POTASSIUM CHLORIDE, AND CALCIUM CHLORIDE 2000 ML: .6; .31; .03; .02 INJECTION, SOLUTION INTRAVENOUS at 01:47

## 2017-10-21 RX ADMIN — CHLORHEXIDINE GLUCONATE 15 ML: 1.2 RINSE ORAL at 08:20

## 2017-10-21 RX ADMIN — POTASSIUM CHLORIDE 20 MEQ: 200 INJECTION, SOLUTION INTRAVENOUS at 14:42

## 2017-10-21 RX ADMIN — METRONIDAZOLE 500 MG: 500 INJECTION, SOLUTION INTRAVENOUS at 21:45

## 2017-10-21 RX ADMIN — POTASSIUM CHLORIDE 20 MEQ: 200 INJECTION, SOLUTION INTRAVENOUS at 06:08

## 2017-10-21 RX ADMIN — SODIUM CHLORIDE 1000 ML: 0.9 INJECTION, SOLUTION INTRAVENOUS at 09:08

## 2017-10-21 RX ADMIN — HEPARIN SODIUM 5000 UNITS: 5000 INJECTION, SOLUTION INTRAVENOUS; SUBCUTANEOUS at 21:52

## 2017-10-21 RX ADMIN — PIPERACILLIN SODIUM,TAZOBACTAM SODIUM 2.25 G: 2; .25 INJECTION, POWDER, FOR SOLUTION INTRAVENOUS at 16:11

## 2017-10-21 RX ADMIN — POTASSIUM CHLORIDE: 2 INJECTION, SOLUTION, CONCENTRATE INTRAVENOUS at 16:11

## 2017-10-21 RX ADMIN — POTASSIUM CHLORIDE 20 MEQ: 200 INJECTION, SOLUTION INTRAVENOUS at 16:11

## 2017-10-21 RX ADMIN — POTASSIUM CHLORIDE 40 MEQ: 20 SOLUTION ORAL at 00:51

## 2017-10-21 RX ADMIN — METRONIDAZOLE 500 MG: 500 INJECTION, SOLUTION INTRAVENOUS at 05:37

## 2017-10-21 RX ADMIN — METRONIDAZOLE 500 MG: 500 INJECTION, SOLUTION INTRAVENOUS at 13:44

## 2017-10-21 RX ADMIN — PIPERACILLIN SODIUM,TAZOBACTAM SODIUM 2.25 G: 2; .25 INJECTION, POWDER, FOR SOLUTION INTRAVENOUS at 04:03

## 2017-10-21 RX ADMIN — CHLORHEXIDINE GLUCONATE 15 ML: 1.2 RINSE ORAL at 21:40

## 2017-10-21 RX ADMIN — POTASSIUM CHLORIDE 20 MEQ: 200 INJECTION, SOLUTION INTRAVENOUS at 01:54

## 2017-10-21 RX ADMIN — PIPERACILLIN SODIUM,TAZOBACTAM SODIUM 2.25 G: 2; .25 INJECTION, POWDER, FOR SOLUTION INTRAVENOUS at 10:11

## 2017-10-21 RX ADMIN — POTASSIUM CHLORIDE 20 MEQ: 200 INJECTION, SOLUTION INTRAVENOUS at 04:08

## 2017-10-21 NOTE — PLAN OF CARE
CARDIOVASCULAR - ADULT     Maintains optimal cardiac output and hemodynamic stability Progressing     Absence of cardiac dysrhythmias or at baseline rhythm Progressing        DISCHARGE PLANNING     Discharge to home or other facility with appropriate resources Progressing        GASTROINTESTINAL - ADULT     Minimal or absence of nausea and/or vomiting Progressing     Maintains or returns to baseline bowel function Progressing     Maintains adequate nutritional intake Progressing     Establish and maintain optimal ostomy function Progressing        GENITOURINARY - ADULT     Maintains or returns to baseline urinary function Progressing     Absence of urinary retention Progressing     Urinary catheter remains patent Progressing        INFECTION - ADULT     Absence or prevention of progression during hospitalization Progressing        Knowledge Deficit     Patient/family/caregiver demonstrates understanding of disease process, treatment plan, medications, and discharge instructions Progressing        METABOLIC, FLUID AND ELECTROLYTES - ADULT     Electrolytes maintained within normal limits Progressing     Fluid balance maintained Progressing     Glucose maintained within target range Progressing        MUSCULOSKELETAL - ADULT     Maintain or return mobility to safest level of function Progressing     Maintain proper alignment of affected body part Progressing        NEUROSENSORY - ADULT     Achieves stable or improved neurological status Progressing     Achieves maximal functionality and self care Progressing        PAIN - ADULT     Verbalizes/displays adequate comfort level or baseline comfort level Progressing        Potential for Falls     Patient will remain free of falls Progressing        Prexisting or High Potential for Compromised Skin Integrity     Skin integrity is maintained or improved Progressing        SAFETY ADULT     Maintain or return to baseline ADL function Progressing     Maintain or return mobility status to optimal level Progressing        SKIN/TISSUE INTEGRITY - ADULT     Skin integrity remains intact Progressing     Oral mucous membranes remain intact Progressing

## 2017-10-21 NOTE — H&P
History and Physical - Critical Care   Killian Blank 42 y o  male MRN: [id number]  Unit/Bed#: ED 29 Encounter: [id number]    Reason for Admission / Chief Complaint:     History of Present Illness:  Killian Blank is a 42 y o  male with past medical history of diabetes and schizophrenia who presents to nausea vomiting and diarrhea  He states diffuse abdominal crampiness, but no tenderness or pain  Mucous membranes are pale  Denies fever chills, denies chest pain or shortness of breath  History obtained from the patient      Past Medical History:  Past Medical History:   Diagnosis Date    Diabetes mellitus ([address] )     Psychiatric disorder     schizophrenia, depression       Past Surgical History:  Past Surgical History:   Procedure Laterality Date    UPPER GASTROINTESTINAL ENDOSCOPY         Past Family History:  Family History   Problem Relation Age of Onset    No Known Problems Mother     No Known Problems Father        Social History:  History   Smoking Status    Never Smoker   Smokeless Tobacco    Not on file     History   Alcohol Use No     History   Drug Use No     Marital Status: Single       Medications:  Current Facility-Administered Medications   Medication Dose Route Frequency    chlorhexidine (PERIDEX) 0 12 % oral rinse 15 mL  15 mL Swish [employer] Spit Q12H [address]    heparin (porcine) subcutaneous injection 5,000 Units  5,000 Units Subcutaneous Q8H [address]    lidocaine (LIDODERM) 5 % patch 1 patch  1 patch Transdermal Once    metroNIDAZOLE (FLAGYL) IVPB (premix) 500 mg  500 mg Intravenous Q8H    ondansetron (ZOFRAN) injection 4 mg  4 mg Intravenous Once    piperacillin-tazobactam (ZOSYN) 2 25 g in sodium chloride 0 9 % 50 mL IVPB  2 25 g Intravenous Q6H    sodium chloride 0 9 % infusion  100 mL/hr Intravenous Continuous    vancomycin (VANCOCIN) 2,000 mg in sodium chloride 0 9 % 500 mL IVPB  2,000 mg Intravenous Once     Home medications:  Prior to Admission medications    Medication Sig Start Date End Date Taking? Authorizing Provider   lithium carbonate 300 mg capsule  16  Yes Historical Provider, MD   risperiDONE (RisperDAL) 2 mg tablet Take 2 mg by mouth 16  Yes Historical Provider, MD     Allergies:  No Known Allergies    ROS:   Review of Systems   Constitutional: Positive for fatigue  HENT: Negative  Eyes: Negative  Respiratory: Negative  Cardiovascular: Negative  Gastrointestinal: Positive for abdominal distention, diarrhea and nausea  Negative for abdominal pain and vomiting  Endocrine: Negative  Genitourinary: Positive for decreased urine volume  Negative for difficulty urinating, dysuria, flank pain and hematuria  Musculoskeletal: Negative  Negative for arthralgias, myalgias, neck pain and neck stiffness  Skin: Positive for pallor  Allergic/Immunologic: Negative  Neurological: Positive for weakness  Negative for dizziness, tremors, seizures, speech difficulty, numbness and headaches  Hematological: Negative  Psychiatric/Behavioral: Negative  Vitals:  Vitals:    10/20/17 1830 10/20/17 1900 10/20/17 2106 10/20/17 2242   BP: (!) 92/43 111/54 92/51    Pulse: 61 66 61    Resp: 21 (!) 23 (!) 25    Temp:       TempSrc:       SpO2: 97% 98% 95% 94%   Weight:       Height:   6' 4" (1 93 m)      Temperature:   Temp (24hrs), Av 2 °F (36 2 °C), Min:97 2 °F (36 2 °C), Max:97 2 °F (36 2 °C)    Current Temperature: (!) 97 2 °F (36 2 °C)    Weights:   IBW: 86 8 kg  Body mass index is 42 6 kg/m²  Hemodynamic Monitoring:  N/A     Non-Invasive/Invasive Ventilation Settings:  Respiratory    Lab Data (Last 4 hours)    None         O2/Vent Data (Last 4 hours)    None              No results found for: PHART, IVZ9ZHX, PO2ART, BQR6FCN, Z1BSKDWP, BEART, SOURCE  SpO2: SpO2: 96 %     Physical Exam:  Physical Exam   Constitutional: He is oriented to person, place, and time  He appears well-developed and well-nourished  No distress     HENT:   Head: Normocephalic and atraumatic  Nose: Nose normal    Mouth/Throat: Oropharynx is clear and moist    Eyes: Conjunctivae and EOM are normal  Pupils are equal, round, and reactive to light  No scleral icterus  Neck: Normal range of motion  Neck supple  No JVD present  No tracheal deviation present  Cardiovascular: Normal rate, regular rhythm, normal heart sounds and intact distal pulses  Exam reveals no gallop and no friction rub  No murmur heard  Pulmonary/Chest: Effort normal and breath sounds normal    Abdominal: Soft  He exhibits distension  Bowel sounds are increased  There is no tenderness  There is no rebound and no guarding  Musculoskeletal: Normal range of motion  He exhibits no edema, tenderness or deformity  Neurological: He is alert and oriented to person, place, and time  He has normal strength  No cranial nerve deficit or sensory deficit  GCS eye subscore is 4  GCS verbal subscore is 5  GCS motor subscore is 6  Skin: Skin is warm and dry  No rash noted  No pallor  Psychiatric: His behavior is normal  His affect is blunt         Labs:    Results from last 7 days  Lab Units 10/20/17  2235 10/20/17  1738   WBC Thousand/uL  --  19 93*   HEMOGLOBIN g/dL  --  12 8   HEMATOCRIT %  --  33 7*   PLATELETS Thousands/uL 392* 486*   NEUTROS PCT %  --  81*   MONOS PCT %  --  7      Results from last 7 days  Lab Units 10/20/17  1738   SODIUM mmol/L 116*   POTASSIUM mmol/L 2 2*   CHLORIDE mmol/L 80*   CO2 mmol/L 22   BUN mg/dL 44*   CREATININE mg/dL 6 50*   CALCIUM mg/dL 9 6   TOTAL PROTEIN g/dL 7 6   BILIRUBIN TOTAL mg/dL 0 60   ALK PHOS U/L 112   ALT U/L 36   AST U/L 26   GLUCOSE RANDOM mg/dL 99       Results from last 7 days  Lab Units 10/20/17  2235   MAGNESIUM mg/dL 2 0                Results from last 7 days  Lab Units 10/20/17  2102 10/20/17  1738   LACTIC ACID mmol/L 0 8 2 0     No results found for: TROPONINI    Imaging:   CT abdomen pelvis wo contrast   ED Interpretation       Multiple nondilated fluid-filled loops of lower abdominal small   bowel with liquid stool noted throughout the colon suggestive of   a gastroenteritis/diarrheal disease  There is no significant   colonic wall thickening or pericolonic inflammatory stranding        No evidence of large or small bowel obstruction        No free air or free fluid        Small partially visualized bilateral pleural effusions with   posterior bibasilar atelectasis  Final Result      Multiple nondilated fluid-filled loops of lower abdominal small bowel with liquid stool noted throughout the colon suggestive of a gastroenteritis/diarrheal disease  There is no significant colonic wall thickening or pericolonic inflammatory stranding  No evidence of large or small bowel obstruction  No free air or free fluid  Small partially visualized bilateral pleural effusions with posterior bibasilar atelectasis  Workstation performed: [id number]         XR chest 1 view portable    (Results Pending)      I have personally reviewed pertinent reports  EKG:  Sinus rhythm on the monitor This was personally reviewed by myself  Micro:  No results found for: Lamont Powers    ______________________________________________________________________    Assessment:   Patient Active Problem List   Diagnosis    Hypokalemia    Dehydration    Gastroenteritis    Hyponatremia    Type 2 diabetes mellitus ([facility] [address] )    [facility]           Plan:      Neuro:  Exam nonfocal, but patient states he feels mentally foggy  Ensure sodium correction rate does not exceed 1 milliequivalent/hour  CV:  Hemodynamically stable  Pulm:  Oxygenating well  Supplemental oxygen to keep SpO2 greater than 94%     GI:  Sent ova and parasite, stool sample for C diff and culture  :  Acute kidney injury, likely prerenal from hypovolemia  F/E/N:  Fluid resuscitation    Monitor electrolytes q 4 hours due to severe hypokalemia and severe hyponatremia-replete as needed  Hypovolemic hyponatremia via from fluid losses related to diarrhea  ID:  Continue Vanco, Zosyn, and Flagyl  Narrow as culture data returns  Heme:  Hemoglobin and platelet count stable  Endo:  Check glucose q 6 hours     Msk/Skin:  No issues identified     Disposition:  Admit to the ICU    Counseling / Coordination of Care  Total Critical Care time spent 55 minutes excluding procedures, teaching and family updates  ______________________________________________________________________    VTE Pharmacologic Prophylaxis: Heparin  VTE Mechanical Prophylaxis: sequential compression device    Invasive lines and devices: Invasive Devices     Peripheral Intravenous Line            Peripheral IV 10/20/17 Left Hand less than 1 day    Peripheral IV 10/20/17 Right Antecubital less than 1 day                Code Status: Level 1 - Full Code  POA:    POLST:      Given critical illness, patient length of stay will require greater than two midnights  Portions of the record may have been created with voice recognition software  Occasional wrong word or "sound a like" substitutions may have occurred due to the inherent limitations of voice recognition software  Read the chart carefully and recognize, using context, where substitutions have occurred        ZOE Knapp

## 2017-10-21 NOTE — RESPIRATORY THERAPY NOTE
RT Protocol Note  Madelyn Reason 42 y o  male MRN: [id number]  Unit/Bed#: ED 28 Encounter: [id number]    Assessment     Active Problems:    Hypokalemia    Dehydration    Gastroenteritis    Hyponatremia    Type 2 diabetes mellitus (HCC)      Home Pulmonary Medications:  Per pt he takes no home pulmonary medications     Past Medical History:   Diagnosis Date    Diabetes mellitus ([facility] [address] )     Psychiatric disorder     schizophrenia, depression     Social History     Social History    Marital status: Single     Spouse name: N/A    Number of children: N/A    Years of education: N/A     Social History Main Topics    Smoking status: Never Smoker    Smokeless tobacco: None    Alcohol use No    Drug use: No    Sexual activity: Not Asked     Other Topics Concern    None     Social History Narrative    None       Subjective     Pt denies SOB, chest tightness, pt states breathing is fine at this time  Objective     Physical Exam:   Assessment Type: Assess only  General Appearance: Alert, Awake  Respiratory Pattern: Normal  Chest Assessment: Chest expansion symmetrical  Bilateral Breath Sounds: Clear  Cough: None    Vitals:  Blood pressure 92/51, pulse 61, temperature (!) 97 2 °F (36 2 °C), temperature source Oral, resp  rate (!) 25, height 6' 4" (1 93 m), weight (!) 159 kg (350 lb), SpO2 94 %            Imaging and other studies: I have personally reviewed pertinent films in PACS          Plan     Respiratory Plan: Discontinue Protocol        Resp Comments: pt awake and alert on 2LNC no resp distress noted at this time, pt has no pulmonary hx and take no home meds no idication for txs at this time, disontinue protocol

## 2017-10-21 NOTE — PROGRESS NOTES
Progress Note - Critical Care   Adonis Villar 42 y o  male MRN: [id number]  Unit/Bed#:  Encounter: [id number]    Assessment:   1  Lithium toxicity  2  Nausea/vomiting/diarrhea secondary to # 1 vs  Abdominal source of infection  3  Dehydration   4  DARIN   5  Hyponatremia likely secondary to # 1   6  DM II with hyperglycemia  7  Leukocytosis   8  Elevated lipase   9  Persistent hypokalemia   10  Questionable suicidal ideations   11  History of schizophrenia    Plan:      Neuro:   -monitor neuro status closely   -[facility] ICU   -sleep hygiene  -obviously holding lithium dosing a monitoring lithium levels frequently   -psych consult to evaluate lithium regimen and undetermined suicidal attempt    -patient states that he "always" has suicidal ideations and "he's always had them"    However, he did not a have intention or plan   -1:1 observation until evaluation with psych   -hold other sedative medications   CV:   -monitor hemodynamics  -aggressive volume resuscitation in the setting of severe dehydration    Lung:   -monitor respiratory status closely   -O2 to keep O2 sat greater than 92%  -early mobilization   -incentive spirometry when able   GI:   -ova/parasite, stool culture and c diff sample pending  -zofran for nausea  -nausea and vomiting may be related to lithium toxicity   -broad spectrum abx coverage for abdominal source of infection   -clear liquid diet as tolerated    FEN:   -aggressive monitoring and repletion of electrolytes   -will add K and bicarb to IVF   -may need to replete volume 1:1 with large volume diarrhea   -nephrology on consult, appreciate recommendations   :   -monitor urine output closely   -renal ultrasound to rule out obstruction as cause of renal failure, read is pending    ID:   -continue broad spectrum abdominal coverage  -blood cultures, urine, ova/and parasite, stool culture pending  -monitor temperature curve and wbcs   Heme:   -heparin for dvt prophylaxis    Endo:   -will add AC/HS glucose checks with SSI coverage   Msk/Skin:   -turn and reposition   -PT/OT when appropriate    Disposition:   -monitor in critical care     ______________________________________________________________________    HPI/24hr events:   Please see h/p for 24 hour events    ______________________________________________________________________    Physical Exam:   PHYSICAL EXAM  General :   Chronically ill appearing male, awake, alert, intermittently disoriented  However, he immediately corrects himself  Neuro:   GCS=14   CN 2-12 intact  Non Focal  HEENT:  Normocephalic, atraumatic, Pupils 3 brisk bilat  PERRLA, EOMI, hearing grossly intact  Symmetrical facial expressions without droop or slurred speech  Tongue midline without fasiculations  Neck:  No JVD, FROM, No masses/adenopathy  Back:   Symmetrical, atruamatic, spinous process pain free on palpation  Cardiovascular:   No heaves,lifts,thrills  S1/S2 [email or web address] No noted MRGC  Pulmonary:   Symmetrical expansion of chest  Resp even & unlabored  GI :   Abd obese SNT + BSX4 quads  No palp/pulsitile masses  :  N/A  Musculoskeletal:  Symmetrical  No obvious deformity  FROM in UE & LE  Muscle strength 4/5  No lymphadenopathy  Extrem warm to touch  DTR grossly intact  Brachial/radial/femoral/popliteal/pedal/posterior tibial pulses +2  No lesions noted  CN 2-12 grossly intact  No rhomberg   Sensation and motor function intact   ______________________________________________________________________  Vitals:    10/21/17 0700 10/21/17 0800 10/21/17 0900 10/21/17 1100   BP: 102/53 (!) 102/48 111/56 102/52   Pulse: 60 61 62 66   Resp: (!) 24 18 18 20   Temp: 97 6 °F (36 4 °C)   97 5 °F (36 4 °C)   TempSrc: Oral   Oral   SpO2: 96% 96% 97% 99%   Weight:       Height:                  Temperature:   Temp (24hrs), Av 7 °F (36 5 °C), Min:97 2 °F (36 2 °C), Max:98 1 °F (36 7 °C)    Current Temperature: 97 5 °F (36 4 °C)  Weights:   IBW: 86 8 kg    Body mass index is 41 27 kg/m²  Weight (last 2 days)     Date/Time   Weight    10/21/17 0540  (!)  154 (339 07)    10/20/17 2341  (!)  151 (332 01)    10/20/17 1717  (!)  159 (350)            Hemodynamic Monitoring:  N/A     Non-Invasive/Invasive Ventilation Settings:  Respiratory    Lab Data (Last 4 hours)    None         O2/Vent Data (Last 4 hours)    None              No results found for: PHART, WCE1THA, PO2ART, YZW9BSY, C4VAYKJL, BEART, SOURCE  SpO2: SpO2: 98 %  Intake and Outputs:  I/O       10/19 0701 - 10/20 0700 10/20 0701 - 10/21 0700 10/21 0701 - 10/22 0700    P  O    1350    I V  (mL/kg)  743 3 (4 8) 198 3 (1 3)    IV Piggyback  5227 5 1100    Total Intake(mL/kg)  5970 8 (38 8) 2648 3 (17 2)    Urine (mL/kg/hr)  170 400 (0 3)    Stool  0 0 (0)    Total Output   170 400    Net   +5800 8 +2248  3           Unmeasured Stool Occurrence  5 x 1 x        Nutrition:        Diet Orders            Start     Ordered    10/20/17 2120  Diet Clear Liquid  Diet effective now     Question Answer Comment   Diet Type Clear Liquid    RD to adjust diet per protocol?  No        10/20/17 2120        Labs:     Results from last 7 days  Lab Units 10/21/17  0506 10/20/17  2235 10/20/17  1738   WBC Thousand/uL 17 28*  --  19 93*   HEMOGLOBIN g/dL 11 8*  --  12 8   HEMATOCRIT % 31 8*  --  33 7*   PLATELETS Thousands/uL 401* 392* 486*   NEUTROS PCT % 79*  --  81*   MONOS PCT % 8  --  7      Results from last 7 days  Lab Units 10/21/17  1222 10/21/17  0928 10/21/17  0506 10/21/17  0253  10/20/17  1738   SODIUM mmol/L 120*  --  118*  119* 118*  < > 116*   POTASSIUM mmol/L 2 8*  --  3 0*  3 3* 3 1*  < > 2 2*   CHLORIDE mmol/L 89*  --  86*  86* 86*  < > 80*   CO2 mmol/L 19*  --  21  20* 21  < > 22   BUN mg/dL 44*  --  45*  46* 45*  < > 44*   CREATININE mg/dL 6 09*  --  6 67*  6 70* 6 70*  < > 6 50*   CALCIUM mg/dL 7 8*  --  8 5  8 5 8 2*  < > 9 6   TOTAL PROTEIN g/dL  --  5 7*  --   --   --  7 6   BILIRUBIN TOTAL mg/dL  --  0 60  --   --   --  0 60 ALK PHOS U/L  --  98  --   --   --  112   ALT U/L  --  28  --   --   --  36   AST U/L  --  20  --   --   --  26   GLUCOSE RANDOM mg/dL 159*  --  80  77 79  < > 99   < > = values in this interval not displayed  Results from last 7 days  Lab Units 10/21/17  1222 10/21/17  0506 10/21/17  0253   MAGNESIUM mg/dL 1 7 1 9  2 0 1 8                Results from last 7 days  Lab Units 10/20/17  2102   LACTIC ACID mmol/L 0 8     No results found for: TROPONINI  Imaging:   XR chest 1 view portable   Final Result   Oblique opacity right lung base, subsegmental atelectasis versus infiltrate          Findings are consistent with emergency room physician's preliminary reading         Workstation performed: [id number]         CT abdomen pelvis wo contrast   ED Interpretation       Multiple nondilated fluid-filled loops of lower abdominal small   bowel with liquid stool noted throughout the colon suggestive of   a gastroenteritis/diarrheal disease  There is no significant   colonic wall thickening or pericolonic inflammatory stranding        No evidence of large or small bowel obstruction        No free air or free fluid        Small partially visualized bilateral pleural effusions with   posterior bibasilar atelectasis  Final Result      Multiple nondilated fluid-filled loops of lower abdominal small bowel with liquid stool noted throughout the colon suggestive of a gastroenteritis/diarrheal disease  There is no significant colonic wall thickening or pericolonic inflammatory stranding  No evidence of large or small bowel obstruction  No free air or free fluid  Small partially visualized bilateral pleural effusions with posterior bibasilar atelectasis           Workstation performed: [id number]         US kidney and bladder    (Results Pending)     EKG: NSR   Micro:  No results found for: Michelle Nest, WOUNDCULT, SPUTUMCULTUR  Allergies: No Known Allergies  Medications:   Scheduled Meds:  chlorhexidine 15 mL Swish & Spit Q12H [facility]   heparin (porcine) 5,000 Units Subcutaneous Q8H [facility]   metroNIDAZOLE 500 mg Intravenous Q8H   ondansetron 4 mg Intravenous Once   piperacillin-tazobactam 2 25 g Intravenous Q6H     Continuous Infusions:  sodium chloride 100 mL/hr Last Rate: 250 mL/hr (10/21/17 1059)     PRN Meds:     VTE Pharmacologic Prophylaxis: Sequential compression device (Venodyne)  and Heparin  VTE Mechanical Prophylaxis: sequential compression device  Invasive lines and devices: Invasive Devices     Peripheral Intravenous Line            Peripheral IV 10/20/17 Left Hand less than 1 day    Peripheral IV 10/20/17 Right Antecubital less than 1 day    Peripheral IV 10/21/17 Left Antecubital less than 1 day          Drain            Urethral Catheter 16 Fr  less than 1 day                   Counseling / Coordination of Care  Total Critical Care time spent 49 minutes excluding procedures, teaching and family updates  Code Status: Level 1 - Full Code    Portions of the record may have been created with voice recognition software  Occasional wrong word or "sound a like" substitutions may have occurred due to the inherent limitations of voice recognition software  Read the chart carefully and recognize, using context, where substitutions have occurred      ZOE Casillas

## 2017-10-21 NOTE — CONSULTS
CONSULTING PHYSICIAN:  ICU    REASON FOR THE CONSULTATION: - possible Juan Manuel I, hyponatremia    DATE OF CONSULTATION:  October 25    ADMISSION DIAGNOSIS: Hyponatremia     CHIEF COMPLAINS     Patient mentioned he was sent in because his physician told him has pancreatic enzymes were high    HPI   53-year-old male, past history mention of schizophrenia, patient has also been on lithium and Risperdal, patient has been on lithium for almost 8 years as per him, and he has not had any issues  As per his knowledge, he does not have a baseline kidney issue  He mentioned he was having diarrhea for the past 10 days which was profuse, and he was having a bowel movement which was loose Q 1 hour, with no documented fevers, takes low-dose lisinopril at home and usually is the blood pressure runs much higher than what it has been since admission  He received vigorous IV hydration  Lactic acid of 2 on admission, which came down to 0 8, also was found to be hyponatremic, 116 on admission with hypokalemia, mixed gap metabolic acidosis  Patient's urine output has only been reported to be 180 cc, he does have a for a so the ins and outs seem to be accurate  His systolic blood pressure is now running in the mid 90s, and is on maintenance fluids  He denies having abdominal pain CT abdomen did not reveal peripancreatic fluid, but was consistent with diarrheal disease  He denies chills currently, abdominal pain, chest pain, shortness of breath, nausea  Has been started on Zosyn urinalysis likely showing signs of infection, cultures are pending, TSH was unremarkable        PAST MEDICAL HISTORY     Past Medical History:   Diagnosis Date    Diabetes mellitus ([facility] [address] )     Psychiatric disorder     schizophrenia, depression       PAST SURGICAL HISTORY     Past Surgical History:   Procedure Laterality Date    UPPER GASTROINTESTINAL ENDOSCOPY         ALLERGIES     No Known Allergies    SOCIAL HISTORY     History   Alcohol Use No     History Drug Use No     History   Smoking Status    Never Smoker   Smokeless Tobacco    Never Used       FAMILY HISTORY     Family History   Problem Relation Age of Onset    No Known Problems Mother     No Known Problems Father        CURRENT MEDICATIONS       Current Facility-Administered Medications:     chlorhexidine (PERIDEX) 0 12 % oral rinse 15 mL, 15 mL, Swish & Spit, Q12H [address], ZOE Burks, 15 mL at 10/21/17 0820    heparin (porcine) subcutaneous injection 5,000 Units, 5,000 Units, Subcutaneous, Q8H [address], 5,000 Units at 10/21/17 0540 **AND** Platelet count, , , Once, ZOE Burks    lidocaine (LIDODERM) 5 % patch 1 patch, 1 patch, Transdermal, Once, Alesia EMELINA Coppersmith, DO, 1 patch at 10/20/17 2252    metroNIDAZOLE (FLAGYL) IVPB (premix) 500 mg, 500 mg, Intravenous, Q8H, ZOE Burks, Stopped at 10/21/17 6333    ondansetron (ZOFRAN) injection 4 mg, 4 mg, Intravenous, Once, Alesia L Coppersmith, DO, Stopped at 10/20/17 1830    piperacillin-tazobactam (ZOSYN) 2 25 g in sodium chloride 0 9 % 50 mL IVPB, 2 25 g, Intravenous, Q6H, ZOE Burks, Stopped at 10/21/17 4290    sodium chloride 0 9 % bolus 1,000 mL, 1,000 mL, Intravenous, Once, ZOE Mayes, Last Rate: 1,000 mL/hr at 10/21/17 0908, 1,000 mL at 10/21/17 0908    sodium chloride 0 9 % infusion, 100 mL/hr, Intravenous, Continuous, ZOE Burks, Last Rate: 100 mL/hr at 10/20/17 2235, 100 mL/hr at 10/20/17 2235    vancomycin (VANCOCIN) 1,750 mg in sodium chloride 0 9 % 500 mL IVPB, 15 mg/kg (Adjusted), Intravenous, Q24H, ZOE Mayes    REVIEW OF SYSTEMS     Complete 10 point review of systems were obtained and discussed in length with the patient  Complete review of systems were negative / unremarkable except mentioned in the HPI section       LAB RESULTS          Results from last 7 days  Lab Units 10/21/17  0506 10/21/17  0250 10/21/17  0049 10/20/17  2235 10/20/17  1738   WBC Thousand/uL 17 28*  --   --   --  19 93*   HEMOGLOBIN g/dL 11 8*  --   --   --  12 8   HEMATOCRIT % 31 8*  --   --   --  33 7*   PLATELETS Thousands/uL 401*  --   --  392* 486*   SODIUM mmol/L 118*  119* 118* 118* 117* 116*   POTASSIUM mmol/L 3 0*  3 3* 3 1* 2 2* 2 3* 2 2*   CHLORIDE mmol/L 86*  86* 86* 84* 83* 80*   CO2 mmol/L 21  20* 21 21 22 22   BUN mg/dL 45*  46* 45* 46* 45* 44*   CREATININE mg/dL 6 67*  6 70* 6 70* 6 83* 6 73* 6 50*   CALCIUM mg/dL 8 5  8 5 8 2* 8 5 8 6 9 6   MAGNESIUM mg/dL 1 9  2 0 1 8 1 9 2 0  --    ALBUMIN g/dL  --   --   --   --  4 1   TOTAL PROTEIN g/dL  --   --   --   --  7 6   GLUCOSE RANDOM mg/dL 80  77 79 98 73 99       I have personally reviewed the old medical records and patient's previously known baseline creatinine level is ~ unknown    RADIOLOGY RESULTS     Results for orders placed during the hospital encounter of 10/20/17   XR chest 1 view portable    Narrative CHEST  PORTABLE    INDICATION: 62-year-old male, fatigue, nausea, vomiting     COMPARISON:  None    VIEWS:   AP frontal; 1 image    FINDINGS:    The cardiomediastinal silhouette is unremarkable  Oblique opacity at right lung base, subsegmental atelectasis versus infiltrate  Continued follow-up recommended  No pleural effusion  Bony thorax unremarkable  Impression Oblique opacity right lung base, subsegmental atelectasis versus infiltrate       Findings are consistent with emergency room physician's preliminary reading      Workstation performed: [id number]       No results found for this or any previous visit  No results found for this or any previous visit  No results found for this or any previous visit  Results for orders placed during the hospital encounter of 10/20/17   CT abdomen pelvis wo contrast    Narrative CT ABDOMEN AND PELVIS WITHOUT IV CONTRAST    INDICATION:  Abdominal pain  Diarrhea  COMPARISON: None      TECHNIQUE:  CT examination of the abdomen and pelvis was performed without intravenous contrast   Reformatted images were created in axial, sagittal, and coronal planes  Radiation dose length product (DLP) for this visit:  1500 mGy-cm   This examination, like all CT scans performed in the [facility], was performed utilizing techniques to minimize radiation dose exposure, including the use of iterative   reconstruction and automated exposure control  Enteric contrast was administered  FINDINGS:    ABDOMEN    LOWER CHEST:  There is bibasilar subsegmental atelectasis with trace bilateral pleural effusions  LIVER/BILIARY TREE:  Unremarkable  GALLBLADDER:  Gallbladder is surgically absent  SPLEEN:  Unremarkable  PANCREAS:  Unremarkable  ADRENAL GLANDS:  There is a 15 mm left adrenal adenoma  The right adrenal gland is unremarkable  KIDNEYS/URETERS:  Unremarkable  No hydronephrosis  STOMACH AND BOWEL:  There are multiple nondilated loops of fluid-filled of small bowel within the lower abdomen and pelvis as well as liquid stool throughout the colon  APPENDIX:  No findings to suggest appendicitis  ABDOMINOPELVIC CAVITY:  No ascites or free intraperitoneal air  No lymphadenopathy  VESSELS:  Unremarkable for patient's age  PELVIS    REPRODUCTIVE ORGANS:  Unremarkable for patient's age  URINARY BLADDER:  Unremarkable  ABDOMINAL WALL/INGUINAL REGIONS:  There are tiny bilateral fat-containing inguinal hernias  OSSEOUS STRUCTURES:  No acute fracture or destructive osseous lesion  Impression Multiple nondilated fluid-filled loops of lower abdominal small bowel with liquid stool noted throughout the colon suggestive of a gastroenteritis/diarrheal disease  There is no significant colonic wall thickening or pericolonic inflammatory stranding  No evidence of large or small bowel obstruction  No free air or free fluid      Small partially visualized bilateral pleural effusions with posterior bibasilar atelectasis  Workstation performed: [id number]       No results found for this or any previous visit  OBJECTIVE     Current Weight: Weight - Scale: (!) 154 kg (339 lb 1 1 oz)  Vitals:    10/21/17 0900   BP: 111/56   Pulse: 62   Resp: 18   Temp:    SpO2: 97%       Intake/Output Summary (Last 24 hours) at 10/21/17 0916  Last data filed at 10/21/17 0800   Gross per 24 hour   Intake          6269 17 ml   Output              245 ml   Net          6024 17 ml       PHYSICAL EXAMINATION   General - patient is awake at this time he does not have any complaints  Abdomen obese nontender soft  Extremities 1+ pulses no edema is noted  Neuro he seems to be baseline nonfocal  Chest bilateral good air entry, equal I do not hear any adventitious sounds  CVS S1-S2 heard      PLAN / RECOMMENDATIONS      1  Acute kidney injury with unknown patient renal baseline, he does believe he has normal kidney function at baseline though, has been on lithium for 8 years, will check a renal ultrasound was assess for any obstruction, on account of oliguria, will also request urine electrolytes urine sodium, chloride, osmolality  He has received fluid resuscitation though  Also will request urine eosinophil since the patient was on a PPI  Renal ultrasound will also reveal any cystic changes if at all secondary to lithium use  It him level was 1 7 which could contribute to mild Juan Manuel I, but his creatinine has been close to 6 5  If he does have some chronic component it will be tubular interstitial from will with the abuse  The patient believes is lithium level has usually been therapeutic  Also mentioned did not take an extra dose of lithium and he is quite positive about that      2   Because of possibility of ischemic tubular injury from hypotension, likely from hypovolemia, agree with fluid resuscitation, the patient has been having diarrhea which is confirmed on the CT, for prolonged periods of time with very poor p o  intake  Has received fluid resuscitation, but blood pressure still continues to be lower than his baseline  Avoid nephrotoxins agents including Ace inhibitors   3  Hyponatremia, possibility of hypovolemic hyponatremia, sodium did not improve much with IV fluids isotonic toe, patient has also been on SSRI, which could cause inappropriate ADH, will be hard to assess that since his volume depleted therefore he will have a DH on board to maintain adequate volume status  Currently no indication for hypertonic saline  But will monitor closely and consider hypertonic saline if sodium drops further, or any clinical indication arises    4  Continue to monitor ins and outs    5  Initially mixed gap acidosis, less likely to have a component of ketosis even though the patient has been on SGLT inhibitors, now mostly non gap, in the setting of diarrhea, your patient's bicarb worsens would switch his fluids to isotonic fluids with the source of bicarbonate half-normal with 75 mEq of bicarb  6   Hypokalemia correct and monitor, correction of hypokalemia will aid in correction of hyponatremia    7  TSH has been checked, will check a random cortisol level if blood pressure does not respond to fluids    Thank you for the consultation to participate in patient's care  I have personally discussed my plan with the referring physician  Norris Covarrubias MD  Nephrology  10/21/2017        Portions of the record may have been created with voice recognition software  Occasional wrong word or "sound a like" substitutions may have occurred due to the inherent limitations of voice recognition software  Read the chart carefully and recognize, using context, where substitutions have occurred

## 2017-10-22 LAB
ANION GAP SERPL CALCULATED.3IONS-SCNC: 6 MMOL/L (ref 4–13)
ANION GAP SERPL CALCULATED.3IONS-SCNC: 7 MMOL/L (ref 4–13)
ARTERIAL PATENCY WRIST A: NO
ARTERIAL PATENCY WRIST A: NO
BACTERIA UR CULT: NORMAL
BASE EX.OXY STD BLDV CALC-SCNC: 93.7 % (ref 60–80)
BASE EX.OXY STD BLDV CALC-SCNC: 95.8 % (ref 60–80)
BASE EXCESS BLDV CALC-SCNC: -4.3 MMOL/L
BASE EXCESS BLDV CALC-SCNC: -7.2 MMOL/L
BASOPHILS # BLD AUTO: 0.03 THOUSANDS/ΜL (ref 0–0.1)
BASOPHILS NFR BLD AUTO: 0 % (ref 0–1)
BODY TEMPERATURE: 98.4 DEGREES FEHRENHEIT
BUN SERPL-MCNC: 21 MG/DL (ref 5–25)
BUN SERPL-MCNC: 27 MG/DL (ref 5–25)
BUN SERPL-MCNC: 31 MG/DL (ref 5–25)
BUN SERPL-MCNC: 34 MG/DL (ref 5–25)
CA-I BLD-SCNC: 1.14 MMOL/L (ref 1.12–1.32)
CA-I BLD-SCNC: 1.18 MMOL/L (ref 1.12–1.32)
CA-I BLD-SCNC: 1.21 MMOL/L (ref 1.12–1.32)
CALCIUM SERPL-MCNC: 8 MG/DL (ref 8.3–10.1)
CALCIUM SERPL-MCNC: 8.1 MG/DL (ref 8.3–10.1)
CALCIUM SERPL-MCNC: 8.3 MG/DL (ref 8.3–10.1)
CALCIUM SERPL-MCNC: 8.6 MG/DL (ref 8.3–10.1)
CHLORIDE SERPL-SCNC: 101 MMOL/L (ref 100–108)
CHLORIDE SERPL-SCNC: 93 MMOL/L (ref 100–108)
CHLORIDE SERPL-SCNC: 96 MMOL/L (ref 100–108)
CHLORIDE SERPL-SCNC: 98 MMOL/L (ref 100–108)
CO2 SERPL-SCNC: 23 MMOL/L (ref 21–32)
CO2 SERPL-SCNC: 25 MMOL/L (ref 21–32)
CREAT SERPL-MCNC: 2.11 MG/DL (ref 0.6–1.3)
CREAT SERPL-MCNC: 2.56 MG/DL (ref 0.6–1.3)
CREAT SERPL-MCNC: 2.97 MG/DL (ref 0.6–1.3)
CREAT SERPL-MCNC: 3.47 MG/DL (ref 0.6–1.3)
EOSINOPHIL # BLD AUTO: 0.22 THOUSAND/ΜL (ref 0–0.61)
EOSINOPHIL NFR BLD AUTO: 2 % (ref 0–6)
ERYTHROCYTE [DISTWIDTH] IN BLOOD BY AUTOMATED COUNT: 12.5 % (ref 11.6–15.1)
GFR SERPL CREATININE-BSD FRML MDRD: 20 ML/MIN/1.73SQ M
GFR SERPL CREATININE-BSD FRML MDRD: 24 ML/MIN/1.73SQ M
GFR SERPL CREATININE-BSD FRML MDRD: 29 ML/MIN/1.73SQ M
GFR SERPL CREATININE-BSD FRML MDRD: 37 ML/MIN/1.73SQ M
GLUCOSE SERPL-MCNC: 124 MG/DL (ref 65–140)
GLUCOSE SERPL-MCNC: 129 MG/DL (ref 65–140)
GLUCOSE SERPL-MCNC: 140 MG/DL (ref 65–140)
GLUCOSE SERPL-MCNC: 142 MG/DL (ref 65–140)
GLUCOSE SERPL-MCNC: 152 MG/DL (ref 65–140)
GLUCOSE SERPL-MCNC: 156 MG/DL (ref 65–140)
GLUCOSE SERPL-MCNC: 164 MG/DL (ref 65–140)
GLUCOSE SERPL-MCNC: 172 MG/DL (ref 65–140)
GLUCOSE SERPL-MCNC: 180 MG/DL (ref 65–140)
HCO3 BLDV-SCNC: 17.9 MMOL/L (ref 24–30)
HCO3 BLDV-SCNC: 21.3 MMOL/L (ref 24–30)
HCT VFR BLD AUTO: 28.7 % (ref 36.5–49.3)
HGB BLD-MCNC: 10.3 G/DL (ref 12–17)
LITHIUM SERPL-SCNC: 1.4 MMOL/L (ref 0.5–1)
LITHIUM SERPL-SCNC: 1.6 MMOL/L (ref 0.5–1)
LITHIUM SERPL-SCNC: 1.7 MMOL/L (ref 0.5–1)
LYMPHOCYTES # BLD AUTO: 1.35 THOUSANDS/ΜL (ref 0.6–4.47)
LYMPHOCYTES NFR BLD AUTO: 10 % (ref 14–44)
MAGNESIUM SERPL-MCNC: 2.1 MG/DL (ref 1.6–2.6)
MAGNESIUM SERPL-MCNC: 2.1 MG/DL (ref 1.6–2.6)
MAGNESIUM SERPL-MCNC: 2.2 MG/DL (ref 1.6–2.6)
MAGNESIUM SERPL-MCNC: 2.2 MG/DL (ref 1.6–2.6)
MCH RBC QN AUTO: 29 PG (ref 26.8–34.3)
MCHC RBC AUTO-ENTMCNC: 35.9 G/DL (ref 31.4–37.4)
MCV RBC AUTO: 81 FL (ref 82–98)
MONOCYTES # BLD AUTO: 1.38 THOUSAND/ΜL (ref 0.17–1.22)
MONOCYTES NFR BLD AUTO: 10 % (ref 4–12)
NASAL CANNULA: 2
NASAL CANNULA: 2
NEUTROPHILS # BLD AUTO: 10.18 THOUSANDS/ΜL (ref 1.85–7.62)
NEUTS SEG NFR BLD AUTO: 77 % (ref 43–75)
NRBC BLD AUTO-RTO: 0 /100 WBCS
O2 CT BLDV-SCNC: 15.5 ML/DL
O2 CT BLDV-SCNC: 15.7 ML/DL
PCO2 BLDV: 34.7 MM HG (ref 42–50)
PCO2 BLDV: 41.4 MM HG (ref 42–50)
PH BLDV: 7.33 [PH] (ref 7.3–7.4)
PH BLDV: 7.33 [PH] (ref 7.3–7.4)
PLATELET # BLD AUTO: 339 THOUSANDS/UL (ref 149–390)
PMV BLD AUTO: 9.5 FL (ref 8.9–12.7)
PO2 BLDV: 104.8 MM HG (ref 35–45)
PO2 BLDV: 77.4 MM HG (ref 35–45)
POTASSIUM SERPL-SCNC: 3.3 MMOL/L (ref 3.5–5.3)
POTASSIUM SERPL-SCNC: 3.6 MMOL/L (ref 3.5–5.3)
POTASSIUM SERPL-SCNC: 4.1 MMOL/L (ref 3.5–5.3)
POTASSIUM SERPL-SCNC: 4.2 MMOL/L (ref 3.5–5.3)
RBC # BLD AUTO: 3.55 MILLION/UL (ref 3.88–5.62)
SODIUM SERPL-SCNC: 123 MMOL/L (ref 136–145)
SODIUM SERPL-SCNC: 126 MMOL/L (ref 136–145)
SODIUM SERPL-SCNC: 128 MMOL/L (ref 136–145)
SODIUM SERPL-SCNC: 132 MMOL/L (ref 136–145)
WBC # BLD AUTO: 13.27 THOUSAND/UL (ref 4.31–10.16)

## 2017-10-22 PROCEDURE — 82805 BLOOD GASES W/O2 SATURATION: CPT | Performed by: NURSE PRACTITIONER

## 2017-10-22 PROCEDURE — 80178 ASSAY OF LITHIUM: CPT | Performed by: NURSE PRACTITIONER

## 2017-10-22 PROCEDURE — 85025 COMPLETE CBC W/AUTO DIFF WBC: CPT | Performed by: NURSE PRACTITIONER

## 2017-10-22 PROCEDURE — 80048 BASIC METABOLIC PNL TOTAL CA: CPT | Performed by: NURSE PRACTITIONER

## 2017-10-22 PROCEDURE — 83735 ASSAY OF MAGNESIUM: CPT | Performed by: NURSE PRACTITIONER

## 2017-10-22 PROCEDURE — 82330 ASSAY OF CALCIUM: CPT | Performed by: NURSE PRACTITIONER

## 2017-10-22 PROCEDURE — 82948 REAGENT STRIP/BLOOD GLUCOSE: CPT

## 2017-10-22 RX ORDER — POTASSIUM CHLORIDE 14.9 MG/ML
20 INJECTION INTRAVENOUS
Status: COMPLETED | OUTPATIENT
Start: 2017-10-22 | End: 2017-10-22

## 2017-10-22 RX ORDER — RISPERIDONE 1 MG/1
2 TABLET, FILM COATED ORAL 2 TIMES DAILY
Status: DISCONTINUED | OUTPATIENT
Start: 2017-10-22 | End: 2017-10-24 | Stop reason: HOSPADM

## 2017-10-22 RX ORDER — CITALOPRAM 20 MG/1
20 TABLET ORAL DAILY
Status: DISCONTINUED | OUTPATIENT
Start: 2017-10-22 | End: 2017-10-24 | Stop reason: HOSPADM

## 2017-10-22 RX ADMIN — METRONIDAZOLE 500 MG: 500 INJECTION, SOLUTION INTRAVENOUS at 05:32

## 2017-10-22 RX ADMIN — POTASSIUM CHLORIDE 20 MEQ: 200 INJECTION, SOLUTION INTRAVENOUS at 04:26

## 2017-10-22 RX ADMIN — CHLORHEXIDINE GLUCONATE 15 ML: 1.2 RINSE ORAL at 08:45

## 2017-10-22 RX ADMIN — POTASSIUM CHLORIDE: 2 INJECTION, SOLUTION, CONCENTRATE INTRAVENOUS at 04:04

## 2017-10-22 RX ADMIN — POTASSIUM CHLORIDE 20 MEQ: 200 INJECTION, SOLUTION INTRAVENOUS at 02:18

## 2017-10-22 RX ADMIN — PIPERACILLIN SODIUM,TAZOBACTAM SODIUM 3.38 G: 3; .375 INJECTION, POWDER, FOR SOLUTION INTRAVENOUS at 17:23

## 2017-10-22 RX ADMIN — PIPERACILLIN SODIUM,TAZOBACTAM SODIUM 3.38 G: 3; .375 INJECTION, POWDER, FOR SOLUTION INTRAVENOUS at 06:27

## 2017-10-22 RX ADMIN — HEPARIN SODIUM 5000 UNITS: 5000 INJECTION, SOLUTION INTRAVENOUS; SUBCUTANEOUS at 21:33

## 2017-10-22 RX ADMIN — RISPERIDONE 2 MG: 1 TABLET, FILM COATED ORAL at 17:23

## 2017-10-22 RX ADMIN — POTASSIUM CHLORIDE: 2 INJECTION, SOLUTION, CONCENTRATE INTRAVENOUS at 13:27

## 2017-10-22 RX ADMIN — METRONIDAZOLE 500 MG: 500 INJECTION, SOLUTION INTRAVENOUS at 13:27

## 2017-10-22 RX ADMIN — INSULIN LISPRO 1 UNITS: 100 INJECTION, SOLUTION INTRAVENOUS; SUBCUTANEOUS at 11:49

## 2017-10-22 RX ADMIN — HEPARIN SODIUM 5000 UNITS: 5000 INJECTION, SOLUTION INTRAVENOUS; SUBCUTANEOUS at 13:27

## 2017-10-22 RX ADMIN — PIPERACILLIN SODIUM,TAZOBACTAM SODIUM 3.38 G: 3; .375 INJECTION, POWDER, FOR SOLUTION INTRAVENOUS at 11:49

## 2017-10-22 RX ADMIN — POTASSIUM CHLORIDE 20 MEQ: 200 INJECTION, SOLUTION INTRAVENOUS at 06:28

## 2017-10-22 RX ADMIN — PIPERACILLIN SODIUM,TAZOBACTAM SODIUM 3.38 G: 3; .375 INJECTION, POWDER, FOR SOLUTION INTRAVENOUS at 23:37

## 2017-10-22 RX ADMIN — HEPARIN SODIUM 5000 UNITS: 5000 INJECTION, SOLUTION INTRAVENOUS; SUBCUTANEOUS at 05:33

## 2017-10-22 RX ADMIN — METRONIDAZOLE 500 MG: 500 INJECTION, SOLUTION INTRAVENOUS at 21:33

## 2017-10-22 RX ADMIN — INSULIN LISPRO 1 UNITS: 100 INJECTION, SOLUTION INTRAVENOUS; SUBCUTANEOUS at 21:33

## 2017-10-22 RX ADMIN — CITALOPRAM HYDROBROMIDE 20 MG: 20 TABLET ORAL at 19:13

## 2017-10-22 RX ADMIN — INSULIN LISPRO 1 UNITS: 100 INJECTION, SOLUTION INTRAVENOUS; SUBCUTANEOUS at 17:23

## 2017-10-22 NOTE — PLAN OF CARE
Problem: DISCHARGE PLANNING - CARE MANAGEMENT  Goal: Discharge to post-acute care or home with appropriate resources  INTERVENTIONS:  - Conduct assessment to determine patient/family and health care team treatment goals, and need for post-acute services based on payer coverage, community resources, and patient preferences, and barriers to discharge  - Address psychosocial, clinical, and financial barriers to discharge as identified in assessment in conjunction with the patient/family and health care team  - Arrange appropriate level of post-acute services according to patient's   needs and preference and payer coverage in collaboration with the physician and health care team  - Communicate with and update the patient/family, physician, and health care team regarding progress on the discharge plan  - Arrange appropriate transportation to post-acute venues  Outcome: Progressing  CM met with pt and his father at bedside  Pt lives with his parents in a condo with 2 steps w/railing to enter the residence  He takes care of ADL's and has no DME's  He has never used OP/PT or [place] services  In 1995, pt was in a rehab facility in [address] [place] [address] for schizophrenia  Dr Florance Favre is his psychiatrist and she treats him with medication  He uses [facility] in [address] [place] [address] and has no problem with his co-pays  He has no POA or Advanced Directive  He does not work, but does drive  CM discussed discharge plan with pt and his father  His parents are in their 66's and are concerned about their son when they are no longer around  Father feels that pt is capable of caring for himself-he has no problem with cooking and laundry, but does not clean up after himself  Also, someone to make sure he takes his medication  Pt and father were wondering if there is any agency that could come in and provide these services or an assisted living arrangement that would provide this type of care    CM will speak to Complex  for assistance and keep pt and family informed  CM will continue to follow

## 2017-10-22 NOTE — SOCIAL WORK
CM met with pt and his father at bedside  Pt lives with his parents in a condo with 2 steps w/railing to enter the residence  He takes care of ADL's and has no DME's  He has [facility] used [facility] or [facility] services  In 1995, pt was in a rehab facility in [address] [place] [address] for schizophrenia  Dr John Rosa is his psychiatrist and she treats him with medication  [facility] uses [facility] and has no problem with his co-pays  He has no POA or Advanced Directive  He does not work, but does drive  CM discussed discharge plan with pt and his father  His parents are in their 66's and are concerned about their son when they are no longer around  Father feels that pt is capable of caring for himself-he has no problem with cooking and laundry, but does not clean up after himself  Also, someone to make sure he takes his medication  Pt and father were wondering if there is any agency that could come in and provide these services or an assisted living arrangement that would provide this type of care  CM will speak to Complex  for assistance and keep pt and family informed  CM will continue to follow

## 2017-10-22 NOTE — CONSULTS
Video Consultation    Patient Location:   [facility]  Psychiatrist Location:  [place]    Patient Name:   Lesa Ruvalcaba  :     1970  Date & Time:    10/22/2017 @ 1400 ET    Chief Complaint:   42year-old male with hx Bipolar Disorder vs  Schizophrenia admitted to ICU two days ago for lithium toxicity, DARIN, and electrolyte derangements after presenting to the ED with dehydration and GI distress x 10 days  Father Nida Yepez @ bedside  History of Present Illness:    Pt  reports (and father confirms) pt  has been 100% compliant with prescribed meds and appointments  He states he was feeling well from a psychiatric standpoint up until he became sick with his current presenting symptoms  He denies/minimizes current symptoms of depression, anxiety, and psychosis  He denies SI/HI/AVH, demonstrates future orientation  Father reports he has no imminent safety concerns  Pt  reports that over the last 6 months he was started on Celexa and it was gradually increased to 40mg  but during that time he has not had his lithium level checked  Psychiatric History:   Hx Schizophrenia; followed by psychiatrist Dr Judie Perez; hx previous serious suicide attempt by cutting his neck in ; hx several psychiatric hospitalizations, most recently ;     Psychiatric Medications:   Lithium 1800mg daily, risperidone 2mg AM, 6mg HS, citalopram 40mg daily (increased 6 months)    Substance Abuse History:   UDS NEGATIVE    Active Medical Problems:   DM    Family History:   Non-contributory    Social & Legal History:   Lives with his parents; disabled; receives SSDI; never , no children; Appearance & Attire: gown  Attitude & Behavior: calm and cooperative  Speech:  WNL  Mood / Affect: sleepy, lethargic  Thought Processes: linear  Thought Content: No SI/HI/VI  Perception: No AVH or delusions  Orientation: grossly oriented  Intellectual Functioning: unknown  Insight & Judgment: fair    Impression & Risk:   42year-old male who appears to have had a drug-drug interaction between Celexa and lithium  SRIs can slow the metabolism of lithium, which can lead to toxicity, especially when the prescribed dose of lithium is high (as in this case)  Pt  appears to be approaching his psychiatric baseline again  He is low-risk imminent self-harm or violence  Recommendations:   1  Restart lithium 300mg TID  2  Restart risperidone 2mg BID  3  Restart Celexa 20mg PO daily  4  D/C home when medically cleared  5  Follow-up with outpatient psychiatrist must include routinely scheduled trough lithium levels     Thanks for inviting us to participate in the care of this patient          Nael Castle MD  [facility]

## 2017-10-22 NOTE — CASE MANAGEMENT
[facility] in the [facility] by [facility] for 2017  Network Utilization Review Department  Phone: [phone]; Fax [phone]  ATTENTION: The Network Utilization Review Department is now centralized for our 7 Facilities  Make a note that we have a new phone and fax numbers for our Department  Please call with any questions or concerns to [phone] and carefully follow the prompts so that you are directed to the right person  All voicemails are confidential  Fax any determinations, approvals, denials, and requests for initial or continue stay review clinical to [phone]  Due to HIGH CALL volume, it would be easier if you could please send faxed requests to expedite your requests and in part, help us provide discharge notifications faster  Initial Clinical Review    Admission: Date/Time/Statement: 10/20/17 @ 2113     Orders Placed This Encounter   Procedures    Place in Observation (expected length of stay for this patient is less than two midnights)     Standing Status:   Standing     Number of Occurrences:   1     Order Specific Question:   Admitting Physician     Answer:   Erin Ortiz [[id number]]     Order Specific Question:   Level of Care     Answer:   Critical Care [15]    Inpatient Admission     Standing Status:   Standing     Number of Occurrences:   1     Order Specific Question:   Admitting Physician     Answer:   Erin Ortiz [[id number]]     Order Specific Question:   Level of Care     Answer:   Critical Care [15]     Order Specific Question:   Bed request comments     Answer:   tele     Order Specific Question:   Estimated length of stay     Answer:   More than 2 Midnights     Order Specific Question:   Certification     Answer:   I certify that inpatient services are medically necessary for this patient for a duration of greater than two midnights   See H&P and MD Progress Notes for additional information about the patient's course of treatment   Inpatient Admission     Standing Status:   Standing     Number of Occurrences:   1     Order Specific Question:   Admitting Physician     Answer:   Jesús Marks [[id number]]     Order Specific Question:   Level of Care     Answer:   Critical Care [15]     Order Specific Question:   Estimated length of stay     Answer:   More than 2 Midnights     Order Specific Question:   Certification     Answer:   I certify that inpatient services are medically necessary for this patient for a duration of greater than two midnights  See H&P and MD Progress Notes for additional information about the patient's course of treatment  ED: Date/Time/Mode of Arrival:   ED Arrival Information     Expected Arrival Acuity Means of Arrival Escorted By Service Admission Type    - 10/20/2017 17:06 Emergent Wheelchair Self Critical Care/ICU Emergency    Arrival Complaint    VOMITING          Chief Complaint:   Chief Complaint   Patient presents with    Diarrhea     Pt states he has had nausea and diarrhea for a few days, had blood work done which showed elevated lipase   Neck Pain     Pt c/o weakness, fatigue and neck pain for a few days  Pt unable having difficulty keeping eyes open in triage, mumbling answers  History of Illness: Aron Manzano is a 42 y o  male with past medical history of diabetes and schizophrenia who presents to nausea vomiting and diarrhea  He states diffuse abdominal crampiness, but no tenderness or pain  Mucous membranes are pale    Denies fever chills, denies chest pain or shortness of breath        ED Vital Signs:   ED Triage Vitals [10/20/17 1717]   Temperature Pulse Respirations Blood Pressure SpO2   (!) 97 2 °F (36 2 °C) 71 16 (!) 82/40 92 %      Temp Source Heart Rate Source Patient Position - Orthostatic VS BP Location FiO2 (%)   Oral Monitor Sitting Left arm --      Pain Score       Worst Possible Pain        Wt Readings from Last 1 Encounters:   10/22/17 (!) 154 kg (339 lb 15 2 oz) Vital Signs (abnormal):   10/20/17 2242 -- -- -- -- 94 % -- SL   10/20/17 2106 -- 61  25 92/51 95 % -- JK   10/20/17 1900 -- 66  23 111/54 98 % -- JW   10/20/17 1830 -- 61 21  92/43 97 % -- JW   10/20/17 1745 -- 65  23  98/40            Abnormal Labs/Diagnostic Test Results:   Protein,  (2+) (A) Negative mg/dl      Glucose,  (1/4%) (A) Negative mg/dl     Ketones, UA Negative Negative mg/dl     Urobilinogen, UA 0 2 0 2, 1 0 E U /dl E U /dl     Bilirubin, UA Negative Negative     Blood, UA Moderate (A)        118 (L) 136 - 145 mmol/L      Potassium 2 2 (LL) 3 5 - 5 3 mmol/L     Comment: Verified by repeat analysis       Chloride 84 (L) 100 - 108 mmol/L     CO2 21 21 - 32 mmol/L     Anion Gap 13 4 - 13 mmol/L     BUN 46 (H) 5 - 25 mg/dL     Creatinine 6 83 (H) 0 60 - 1 30 mg/dL     Comment: Standardized to IDMS reference method       Glucose 98        Sodium 116 (L) 136 - 145 mmol/L     Potassium 2 2 (LL) 3 5 - 5 3 mmol/L     Chloride 80 (L) 100 - 108 mmol/L     CO2 22 21 - 32 mmol/L     Anion Gap 14 (H) 4 - 13 mmol/L     BUN 44 (H) 5 - 25 mg/dL     Creatinine 6 50 (H) 0 60 - 1 30 mg/dL     Comment: Standardized to IDMS reference method             WBC 19 93 (H) 4 31 - 10 16 Thousand/uL     RBC 4 33 3 88 - 5 62 Million/uL     Hemoglobin 12 8 12 0 - 17 0 g/dL     Hematocrit 33 7 (L) 36 5 - 49 3 %    plt  486  Lipase  1031    Ct abd -    Multiple nondilated fluid-filled loops of lower abdominal small bowel with liquid stool noted throughout the colon suggestive of a gastroenteritis/diarrheal disease   There is no significant colonic wall thickening or pericolonic inflammatory stranding  No evidence of large or small bowel obstruction  No free air or free fluid  Small partially visualized bilateral pleural effusions with posterior bibasilar atelectasis        CXR -   Oblique opacity right lung base, subsegmental atelectasis versus infiltrate         ED Treatment:   Medication Administration from 10/20/2017 1706 to 10/20/2017 2340       Date/Time Order Dose Route Action Action by Comments     10/20/2017 1744 ondansetron (ZOFRAN) injection 4 mg 4 mg Intravenous Given Delores Montes RN      10/20/2017 1755 dicyclomine (BENTYL) tablet 20 mg 20 mg Oral Given Delores Montes RN      10/20/2017 2120 sodium chloride 0 9 % bolus 1,000 mL 0 mL Intravenous Stopped Simba Abebe RN      10/20/2017 1740 sodium chloride 0 9 % bolus 1,000 mL 1,000 mL Intravenous New Bag Delores Montes, MARIE      10/20/2017 2120 sodium chloride 0 9 % bolus 1,000 mL 0 mL Intravenous Stopped Simba Abebe RN      10/20/2017 1740 sodium chloride 0 9 % bolus 1,000 mL 1,000 mL Intravenous New Bag Delores Montes, MARIE      10/20/2017 1830 ondansetron (ZOFRAN) injection 4 mg 0 mg Intravenous Hold Simba Abebe RN      10/20/2017 2123 potassium chloride 20 mEq IVPB (premix) 0 mEq Intravenous Stopped Simba Abebe RN      10/20/2017 1850 potassium chloride 20 mEq IVPB (premix) 20 mEq Intravenous [address] Delores Montes RN      10/20/2017 1850 potassium chloride (K-DUR,KLOR-CON) CR tablet 40 mEq 40 mEq Oral Given Delores Montes RN      10/20/2017 1851 ondansetron (ZOFRAN) injection 4 mg 4 mg Intravenous Given Delores Montes RN      10/20/2017 1910 metoclopramide (REGLAN) injection 10 mg 10 mg Intravenous Given Simba Abebe RN      10/20/2017 1911 diphenhydrAMINE (BENADRYL) injection 25 mg 25 mg Intravenous Given Simba Abebe RN      10/20/2017 2235 sodium chloride 0 9 % infusion 100 mL/hr Intravenous [address] Simba Abebe RN      10/20/2017 2253 chlorhexidine (PERIDEX) 0 12 % oral rinse 15 mL 15 mL Swish & Spit Given [place]      10/20/2017 2253 heparin (porcine) subcutaneous injection 5,000 Units 5,000 Units Subcutaneous Given Simba Abebe RN      10/20/2017 2252 lidocaine (LIDODERM) 5 % patch 1 patch 1 patch Transdermal Medication Applied Simba Abebe RN posterior neck muscle     10/20/2017 6896 piperacillin-tazobactam (ZOSYN) 2 25 g in sodium chloride 0 9 % 50 mL IVPB 2 25 g Intravenous New Bag Disha Lemon RN           Past Medical/Surgical History: Active Ambulatory Problems     Diagnosis Date Noted    No Active Ambulatory Problems     Resolved Ambulatory Problems     Diagnosis Date Noted    No Resolved Ambulatory Problems     Past Medical History:   Diagnosis Date    Diabetes mellitus ([place] [address] )     Psychiatric disorder        Admitting Diagnosis: Dehydration [E86 0]  Hypokalemia [E87 6]  Neck pain [M54 2]  Gastroenteritis [K52 9]  Hyponatremia [E87 1]  DARIN (acute kidney injury) ([place] [address] ) [N17 9]    Age/Sex: 42 y o  male    Assessment/Plan: Assessment:       Patient Active Problem List   Diagnosis    Hypokalemia    Dehydration    Gastroenteritis    Hyponatremia    Type 2 diabetes mellitus ([place] [address] )    DARIN (acute kidney injury) ([place] [address] )      Plan:                          Neuro:  Exam nonfocal, but patient states he feels mentally foggy  Ensure sodium correction rate does not exceed 1 milliequivalent/hour                            CV:  Hemodynamically stable                            Pulm:  Oxygenating well  Supplemental oxygen to keep SpO2 greater than 94%                           GI:  Sent ova and parasite, stool sample for C diff and culture                            :  Acute kidney injury, likely prerenal from hypovolemia                            F/E/N:  Fluid resuscitation  Monitor electrolytes q 4 hours due to severe hypokalemia and severe hyponatremia-replete as needed  Hypovolemic hyponatremia via from fluid losses related to diarrhea                            ID:  Continue Vanco, Zosyn, and Flagyl    Narrow as culture data returns                            Heme:  Hemoglobin and platelet count stable                            Endo:  Check glucose q 6 hours                           Msk/Skin:  No issues identified                           Disposition:  Admit to the ICU     Counseling / Coordination of Care  Total Critical Care time spent 55 minutes excluding procedures, teaching and family updates  Given critical illness, patient length of stay will require greater than two midnights           Admission Orders:  Scheduled Meds:   heparin (porcine) 5,000 Units Subcutaneous Q8H [address]   insulin lispro 1-5 Units Subcutaneous TID AC   insulin lispro 1-5 Units Subcutaneous HS   metroNIDAZOLE 500 mg Intravenous Q8H   ondansetron 4 mg Intravenous Once   piperacillin-tazobactam 3 375 g Intravenous Q6H   potassium chloride 40 mEq Oral Once     Continuous Infusions:   IV infusion builder  Last Rate: 100 mL/hr at 10/22/17 0404     PRN Meds:     Fingerstick ac and h s  Clear liq diet   Fall precautions   seizure  Precautions  Deep breathing and coughing   OBS 1:1   Suicide precautions  Daily weight   I&O   Neuro checks  q2hr   Bmp, cbc, mg x3 days   Calcium, bmp , lithium  q8hr  OT PT eval   SCD  Nephrology consult 10/21     1  Acute kidney injury with unknown patient renal baseline, he does believe he has normal kidney function at baseline though, has been on lithium for 8 years, will check a renal ultrasound was assess for any obstruction, on account of oliguria, will also request urine electrolytes urine sodium, chloride, osmolality  He has received fluid resuscitation though  Also will request urine eosinophil since the patient was on a PPI  Renal ultrasound will also reveal any cystic changes if at all secondary to lithium use  It him level was 1 7 which could contribute to mild Juan Manuel I, but his creatinine has been close to 6 5  If he does have some chronic component it will be tubular interstitial from will with the abuse  The patient believes is lithium level has usually been therapeutic    Also mentioned did not take an extra dose of lithium and he is quite positive about that      2   Because of possibility of ischemic tubular injury from hypotension, likely from hypovolemia, agree with fluid resuscitation, the patient has been having diarrhea which is confirmed on the CT, for prolonged periods of time with very poor p o  intake  Has received fluid resuscitation, but blood pressure still continues to be lower than his baseline  Avoid nephrotoxins agents including Ace inhibitors       3  Hyponatremia, possibility of hypovolemic hyponatremia, sodium did not improve much with IV fluids isotonic toe, patient has also been on SSRI, which could cause inappropriate ADH, will be hard to assess that since his volume depleted therefore he will have a DH on board to maintain adequate volume status  Currently no indication for hypertonic saline  But will monitor closely and consider hypertonic saline if sodium drops further, or any clinical indication arises     4  Continue to monitor ins and outs     5  Initially mixed gap acidosis, less likely to have a component of ketosis even though the patient has been on SGLT inhibitors, now mostly non gap, in the setting of diarrhea, your patient's bicarb worsens would switch his fluids to isotonic fluids with the source of bicarbonate half-normal with 75 mEq of bicarb      6  Hypokalemia correct and monitor, correction of hypokalemia will aid in correction of hyponatremia     7  TSH has been checked, will check a random cortisol level if blood pressure does not respond to fluids     Critical Care progress note 10/21  Assessment:   1  Lithium toxicity  2  Nausea/vomiting/diarrhea secondary to # 1 vs  Abdominal source of infection  3  Dehydration   4  DARIN   5  Hyponatremia likely secondary to # 1   6  DM II with hyperglycemia  7  Leukocytosis   8  Elevated lipase   9  Persistent hypokalemia   10  Questionable suicidal ideations   11   History of schizophrenia     Plan:                            Neuro:   -monitor neuro status closely   -[facility] ICU   -sleep hygiene  -obviously holding lithium dosing a monitoring lithium levels frequently   -psych consult to evaluate lithium regimen and undetermined suicidal attempt                         -patient states that he "always" has suicidal ideations and "he's always had them"    However, he did not a have intention or plan   -1:1 observation until evaluation with psych   -hold other sedative medications                        CV:   -monitor hemodynamics  -aggressive volume resuscitation in the setting of severe dehydration                         Lung:   -monitor respiratory status closely   -O2 to keep O2 sat greater than 92%  -early mobilization   -incentive spirometry when able                        GI:   -ova/parasite, stool culture and c diff sample pending  -zofran for nausea  -nausea and vomiting may be related to lithium toxicity   -broad spectrum abx coverage for abdominal source of infection   -clear liquid diet as tolerated                         FEN:   -aggressive monitoring and repletion of electrolytes   -will add K and bicarb to IVF   -may need to replete volume 1:1 with large volume diarrhea   -nephrology on consult, appreciate recommendations                        :   -monitor urine output closely   -renal ultrasound to rule out obstruction as cause of renal failure, read is pending                         ID:   -continue broad spectrum abdominal coverage  -blood cultures, urine, ova/and parasite, stool culture pending  -monitor temperature curve and wbcs                        Heme:   -heparin for dvt prophylaxis                         Endo:   -will add AC/HS glucose checks with SSI coverage                        Msk/Skin:   -turn and reposition   -PT/OT when appropriate                         Disposition:   -monitor in critical care     Critical  Care progress note 10/22  Assessment and Plan:   Principal Problem:    Hyponatremia  Active Problems:    Hypokalemia    Dehydration    Gastroenteritis    Type 2 diabetes mellitus (HCC)    DARIN (acute kidney injury) ([facility] [address] )    Lithium toxicity  Resolved Problems:    * No resolved hospital problems  *           Neuro:  Seizure precautions  Serial neuro exams  Continue to monitor closely, as lithium levels remain elevated      CV:  Hemodynamically stable     Pulm:  No acute issues identified, oxygenating well, continue to monitor closely due to high volume fluid resuscitation     GI:  No further nausea vomiting or diarrhea  Awaiting stool cultures, negative for C diff     : Acute kidney injury improving with fluid resuscitation  Nephrology following      F/E/N:  Continue fluid resuscitation, and repletion of electrolytes  Replete bicarbonate  Lithium level remains 1 6-1 7 despite fluid resuscitation      ID:  Leukocytosis is improving  Continue Zosyn and Flagyl  C diff is negative  Awaiting stool cultures      Heme:  H&H and platelets stable     Endo:  Glycemic monitoring                          Msk/Skin:  No issues identified     Psych:  Continue close observation, pending psych evaluation     Disposition:  Continue to monitor in ICU due to severe electrolyte imbalances and lithium toxicity      Code Status: Level 1 - Full Code     Counseling / Coordination of Care  Total Critical Care time spent 35 minutes excluding procedures, teaching and family updates    _

## 2017-10-22 NOTE — PLAN OF CARE
CARDIOVASCULAR - ADULT     Maintains optimal cardiac output and hemodynamic stability Progressing     Absence of cardiac dysrhythmias or at baseline rhythm Progressing        DISCHARGE PLANNING     Discharge to home or other facility with appropriate resources Progressing        GASTROINTESTINAL - ADULT     Minimal or absence of nausea and/or vomiting Progressing     Maintains or returns to baseline bowel function Progressing     Maintains adequate nutritional intake Progressing     Establish and maintain optimal ostomy function Progressing        GENITOURINARY - ADULT     Maintains or returns to baseline urinary function Progressing     Absence of urinary retention Progressing     Urinary catheter remains patent Progressing        INFECTION - ADULT     Absence or prevention of progression during hospitalization Progressing        Knowledge Deficit     Patient/family/caregiver demonstrates understanding of disease process, treatment plan, medications, and discharge instructions Progressing        METABOLIC, FLUID AND ELECTROLYTES - ADULT     Electrolytes maintained within normal limits Progressing     Fluid balance maintained Progressing     Glucose maintained within target range Progressing        MUSCULOSKELETAL - ADULT     Maintain or return mobility to safest level of function Progressing     Maintain proper alignment of affected body part Progressing        NEUROSENSORY - ADULT     Achieves stable or improved neurological status Progressing     Achieves maximal functionality and self care Progressing        Nutrition/Hydration-ADULT     Nutrient/Hydration intake appropriate for improving, restoring or maintaining nutritional needs Progressing        PAIN - ADULT     Verbalizes/displays adequate comfort level or baseline comfort level Progressing        Potential for Falls     Patient will remain free of falls Progressing        Prexisting or High Potential for Compromised Skin Integrity  Skin integrity is maintained or improved Progressing        SAFETY ADULT     Maintain or return to baseline ADL function Progressing     Maintain or return mobility status to optimal level Progressing        SKIN/TISSUE INTEGRITY - ADULT     Skin integrity remains intact Progressing     Oral mucous membranes remain intact Progressing

## 2017-10-22 NOTE — PROGRESS NOTES
[facility] Progress Note  Patient: Natasha Ann 42 y o  male   MRN: [id number]  PCP: Marisa Acosta MD  Unit/Bed#:  Encounter: [id number]  Date Of Visit: 10/22/17    Assessment:    Principal Problem:    Hyponatremia  Active Problems:    Hypokalemia    Dehydration    Gastroenteritis    Type 2 diabetes mellitus (HCC)    DARIN (acute kidney injury) ([facility] [address] )    Lithium toxicity      Plan:    1  Acute kidney injury, with likely normal renal baseline for the patient, renal ultrasound reveals enlarged kidneys no evidence of cystic changes, the patient has been on late him which predisposes firs cystic changes, but not identified on the ultrasound  2  Primary etiology seems prerenal, patient now has excellent urine output, was oligoanuric yesterday likely early ischemic tubular injury, but now in the diuretic phase, also after he was provided with the solute load with IV fluids, now getting rid of free water  3   Hyponatremia, possibly hypovolemic hyponatremia, sodium was, appropriately, currently continue to monitor electrolytes, goal would be 8-10 liquids in the neck is 24 hours, the patient is over collecting will discontinue normal saline, and if to [place] sodium rise, would give him D5W     4   Initially mixed gag acidosis, with bicarbonate with IV fluids has resolved  5   Hypokalemia corrected with supplementation    Spoke with the patient's father in detail            Discussions with Specialists or Other Care Team Provider: icu    Education and Discussions with Family / Patient:     Time Spent for Care: 20 minutes  More than 50% of total time spent on counseling and coordination of care as described above      Current Length of Stay: 2 day(s)    Current Patient Status: Inpatient   Code Status: Level 1 - Full Code      Subjective:   Patient is overall feeling well, awaiting psych well, still thinking is going to happen today    Objective:     Vitals:   Temp (24hrs), Av 4 °F (36 9 °C), Min:98 3 °F (36 8 °C), Max:98 7 °F (37 1 °C)    HR:  [59-73] 64  Resp:  [15-35] 19  BP: ()/(51-75) 118/69  SpO2:  [95 %-99 %] 95 %  Body mass index is 41 38 kg/m²  Input and Output Summary (last 24 hours): Intake/Output Summary (Last 24 hours) at 10/22/17 1347  Last data filed at 10/22/17 1301   Gross per 24 hour   Intake          5399 51 ml   Output            90605 ml   Net         -8350 49 ml       Physical Exam:     Physical Exam   Constitutional: He is oriented to person, place, and time  No distress  HENT:   Head: Normocephalic  Eyes: Pupils are equal, round, and reactive to light  Neck: No tracheal deviation present  Cardiovascular: Normal rate  No murmur heard  Pulmonary/Chest: No respiratory distress  He has no wheezes  Abdominal: He exhibits no distension  Musculoskeletal: He exhibits no edema  Neurological: He is alert and oriented to person, place, and time  Skin: He is not diaphoretic  Additional Data:     Labs:      Results from last 7 days  Lab Units 10/22/17  0435   WBC Thousand/uL 13 27*   HEMOGLOBIN g/dL 10 3*   HEMATOCRIT % 28 7*   PLATELETS Thousands/uL 339   NEUTROS PCT % 77*   LYMPHS PCT % 10*   MONOS PCT % 10   EOS PCT % 2       Results from last 7 days  Lab Units 10/22/17  0814  10/21/17  0928   SODIUM mmol/L 128*  < >  --    POTASSIUM mmol/L 4 1  < >  --    CHLORIDE mmol/L 98*  < >  --    CO2 mmol/L 23  < >  --    BUN mg/dL 27*  < >  --    CREATININE mg/dL 2 56*  < >  --    CALCIUM mg/dL 8 3  < >  --    TOTAL PROTEIN g/dL  --   --  5 7*   BILIRUBIN TOTAL mg/dL  --   --  0 60   ALK PHOS U/L  --   --  98   ALT U/L  --   --  28   AST U/L  --   --  20   GLUCOSE RANDOM mg/dL 140  < >  --    < > = values in this interval not displayed  * I Have Reviewed All Lab Data Listed Above  * Additional Pertinent Lab Tests Reviewed:  All [place] Admission Reviewed    Imaging:      Recent Cultures (last 7 days):       Results from last 7 days  Lab Units 10/21/17  0612 10/21/17  0228 10/20/17  2104 10/20/17  2103   BLOOD CULTURE   --   --  No Growth at 24 hrs  No Growth at 24 hrs  URINE CULTURE  No Growth <1000 cfu/mL  --   --   --    C DIFF TOXIN B   --  NEGATIVE for C difficle toxin by PCR    --   --        Last 24 Hours Medication List:     heparin (porcine) 5,000 Units Subcutaneous Q8H [address]   insulin lispro 1-5 Units Subcutaneous TID AC   insulin lispro 1-5 Units Subcutaneous HS   metroNIDAZOLE 500 mg Intravenous Q8H   ondansetron 4 mg Intravenous Once   piperacillin-tazobactam 3 375 g Intravenous Q6H   potassium chloride 40 mEq Oral Once        Today, Patient Was Seen By: Sophia De La Rosa MD    ** Please Note: Dragon 360 Dictation voice to text software may have been used in the creation of this document   **

## 2017-10-22 NOTE — PROGRESS NOTES
Progress Note - Critical Care   Adonis Villar 42 y o  male MRN: [id number]  Unit/Bed#:  Encounter: [id number]    Attending Physician: Gracie King MD      ______________________________________________________________________  Assessment and Plan:   Principal Problem:    Hyponatremia  Active Problems:    Hypokalemia    Dehydration    Gastroenteritis    Type 2 diabetes mellitus ([facility] )    DARIN (acute kidney injury) ([facility] )    Lithium toxicity  Resolved Problems:    * No resolved hospital problems  *        Neuro:  Seizure precautions  Serial neuro exams  Continue to monitor closely, as lithium levels remain elevated  CV:  Hemodynamically stable    Pulm:  No acute issues identified, oxygenating well, continue to monitor closely due to high volume fluid resuscitation    GI:  No further nausea vomiting or diarrhea  Awaiting stool cultures, negative for C diff    :  Acute kidney injury improving with fluid resuscitation  Nephrology following  F/E/N:  Continue fluid resuscitation, and repletion of electrolytes  Replete bicarbonate  Lithium level remains 1 6-1 7 despite fluid resuscitation  ID:  Leukocytosis is improving  Continue Zosyn and Flagyl  C diff is negative  Awaiting stool cultures  Heme:  H&H and platelets stable    Endo:  Glycemic monitoring     Msk/Skin:  No issues identified    Psych:  Continue close observation, pending psych evaluation    Disposition:  Continue to monitor in ICU due to severe electrolyte imbalances and lithium toxicity  Code Status: Level 1 - Full Code    Counseling / Coordination of Care  Total Critical Care time spent 35 minutes excluding procedures, teaching and family updates  ______________________________________________________________________    Chief Complaint:  Nausea vomiting diarrhea times 10 days  24 Hour Events:  Patient admitted for the above complaint, found to be lithium toxic with severe hypokalemia and hyponatremia    Aggressively fluid resuscitated and electrolytes repleted  Sodium correction appropriate       ______________________________________________________________________    Physical Exam:   GEN:  Well nourished, well developed, appears stated age, no acute distress  HEENT:  Sclera anicteric, mucous membranes pink and moist, conjunctiva pink, no emily/rhinorrhea  Neck:  No lymphadenopathy, normal ROM, supple, no bruits  CV :  S1S2, no murmurs, rubs or gallops  Intact distal pulses  No JVD  Resp:  Lungs CTA =B/L  No subq air or crepitus  Symmetrical expansion  No cough noted  GI :  Abd soft, nontender, no guarding/rebound, nondistended, normoactive bowel sounds X4 quads, no organomegaly appreciated  Neuro:  CN II-XII grossly intact, nonfocal exam, speech clear, GCS 15  Psych:  Flat affect, denies suicidality      ______________________________________________________________________  Vitals:    10/21/17 2300 10/22/17 0100 10/22/17 0300 10/22/17 0400   BP: 91/58 112/58 101/59 103/56   Pulse: 59 59 65 62   Resp: 15 16 19 20   Temp: 98 3 °F (36 8 °C)   98 4 °F (36 9 °C)   TempSrc: Oral   Oral   SpO2: 97% 96% 96% 98%   Weight:       Height:           Temperature:   Temp (24hrs), Av °F (36 7 °C), Min:97 5 °F (36 4 °C), Max:98 4 °F (36 9 °C)    Current Temperature: 98 3 °F (36 8 °C)  Weights:   IBW: 86 8 kg    Body mass index is 41 27 kg/m²  Weight (last 2 days)     Date/Time   Weight    10/21/17 0540  (!)  154 (339 07)    10/20/17 2341  (!)  151 (332 01)    10/20/17 1717  (!)  159 (350)            Hemodynamic Monitoring:  N/A     Non-Invasive/Invasive Ventilation Settings:  Respiratory    Lab Data (Last 4 hours)    None         O2/Vent Data (Last 4 hours)    None              No results found for: PHART, CNP8CUE, PO2ART, ZVX0CPM, J9OAPZBL, BEART, SOURCE  SpO2: SpO2: 97 %  Intake and Outputs:  I/O       10/20 07 - 10/21 0700 10/21 07 - 10/22 0700    P  O   1890    I V  (mL/kg) 743 3 (4 8) 2250 8 (14 6)    IV Piggyback 5227 5 1600 Total Intake(mL/kg) 5970 8 (38 8) 5740 8 (37 3)    Urine (mL/kg/hr) 170 6050 (1 6)    Stool 0 0 (0)    Total Output 170 6050    Net +5800 8 -309 2          Unmeasured Stool Occurrence 5 x 1 x           Nutrition:        Diet Orders            Start     Ordered    10/20/17 2120  Diet Clear Liquid  Diet effective now     Question Answer Comment   Diet Type Clear Liquid    RD to adjust diet per protocol? No        10/20/17 2120          Labs:     Results from last 7 days  Lab Units 10/22/17  0435 10/21/17  0506 10/20/17  2235 10/20/17  1738   WBC Thousand/uL 13 27* 17 28*  --  19 93*   HEMOGLOBIN g/dL 10 3* 11 8*  --  12 8   HEMATOCRIT % 28 7* 31 8*  --  33 7*   PLATELETS Thousands/uL 339 401* 392* 486*   NEUTROS PCT % 77* 79*  --  81*   MONOS PCT % 10 8  --  7       Results from last 7 days  Lab Units 10/22/17  0434 10/22/17  0117 10/21/17  2127  10/21/17  0928  10/20/17  1738   SODIUM mmol/L 126* 123* 121*  < >  --   < > 116*   POTASSIUM mmol/L 3 6 3 3* 3 7  < >  --   < > 2 2*   CHLORIDE mmol/L 96* 93* 91*  < >  --   < > 80*   CO2 mmol/L 23 23 21  < >  --   < > 22   BUN mg/dL 31* 34* 37*  < >  --   < > 44*   CREATININE mg/dL 2 97* 3 47* 4 16*  < >  --   < > 6 50*   CALCIUM mg/dL 8 1* 8 0* 8 0*  < >  --   < > 9 6   TOTAL PROTEIN g/dL  --   --   --   --  5 7*  --  7 6   BILIRUBIN TOTAL mg/dL  --   --   --   --  0 60  --  0 60   ALK PHOS U/L  --   --   --   --  98  --  112   ALT U/L  --   --   --   --  28  --  36   AST U/L  --   --   --   --  20  --  26   GLUCOSE RANDOM mg/dL 129 124 120  < >  --   < > 99   < > = values in this interval not displayed      Results from last 7 days  Lab Units 10/22/17  0434 10/22/17  0117 10/21/17  2127   MAGNESIUM mg/dL 2 1 2 1 2 1     No results found for: PHOS       No results found for: TROPONINI    Results from last 7 days  Lab Units 10/20/17  2102 10/20/17  1738   LACTIC ACID mmol/L 0 8 2 0     ABG:No results found for: PHART, KUG2FKP, PO2ART, MTI8ZFH, E4VNEYAD, BEART, SOURCE  Imaging:   US kidney and bladder   Final Result   1  Negative for hydronephrosis  2   Mildly enlarged bilateral kidneys, without other sonographic abnormality  It is unclear if this is related to an acute renal process versus related to patient's large body habitus with reported height 6 feet 1 inch  Workstation performed: [id number]         XR chest 1 view portable   Final Result   Oblique opacity right lung base, subsegmental atelectasis versus infiltrate          Findings are consistent with emergency room physician's preliminary reading         Workstation performed: [id number]         CT abdomen pelvis wo contrast   ED Interpretation       Multiple nondilated fluid-filled loops of lower abdominal small   bowel with liquid stool noted throughout the colon suggestive of   a gastroenteritis/diarrheal disease  There is no significant   colonic wall thickening or pericolonic inflammatory stranding        No evidence of large or small bowel obstruction        No free air or free fluid        Small partially visualized bilateral pleural effusions with   posterior bibasilar atelectasis  Final Result      Multiple nondilated fluid-filled loops of lower abdominal small bowel with liquid stool noted throughout the colon suggestive of a gastroenteritis/diarrheal disease  There is no significant colonic wall thickening or pericolonic inflammatory stranding  No evidence of large or small bowel obstruction  No free air or free fluid  Small partially visualized bilateral pleural effusions with posterior bibasilar atelectasis  Workstation performed: [id number]            I have personally reviewed pertinent reports      EKG:  Sinus rhythm on the monitor  Micro:  No results found for: Angel Crowley, WOUNDCULT, SPUTUMCULTUR  Allergies: No Known Allergies  Medications:   Scheduled Meds:  chlorhexidine 15 mL Swish & Spit Q12H [address]   heparin (porcine) 5,000 Units Subcutaneous Q8H [address]   insulin lispro 1-5 Units Subcutaneous TID AC   insulin lispro 1-5 Units Subcutaneous HS   metroNIDAZOLE 500 mg Intravenous Q8H   ondansetron 4 mg Intravenous Once   piperacillin-tazobactam 2 25 g Intravenous Q6H   potassium chloride 40 mEq Oral Once   potassium chloride 20 mEq Intravenous Q2H     Continuous Infusions:  IV infusion builder  Last Rate: 100 mL/hr at 10/21/17 1611     PRN Meds:     VTE Pharmacologic Prophylaxis: Heparin  VTE Mechanical Prophylaxis: sequential compression device  Invasive lines and devices: Invasive Devices     Peripheral Intravenous Line            Peripheral IV 10/20/17 Left Hand 1 day    Peripheral IV 10/21/17 Left Antecubital 1 day    Peripheral IV 10/21/17 Right Antecubital less than 1 day          Drain            Urethral Catheter 16 Fr  1 day                     Portions of the record may have been created with voice recognition software  Occasional wrong word or "sound a like" substitutions may have occurred due to the inherent limitations of voice recognition software  Read the chart carefully and recognize, using context, where substitutions have occurred      ZOE Rodriguez

## 2017-10-23 LAB
ANION GAP SERPL CALCULATED.3IONS-SCNC: 5 MMOL/L (ref 4–13)
ANION GAP SERPL CALCULATED.3IONS-SCNC: 6 MMOL/L (ref 4–13)
ANION GAP SERPL CALCULATED.3IONS-SCNC: 8 MMOL/L (ref 4–13)
ANION GAP SERPL CALCULATED.3IONS-SCNC: 8 MMOL/L (ref 4–13)
ATRIAL RATE: 62 BPM
BACTERIA UR QL AUTO: ABNORMAL /HPF
BASOPHILS # BLD AUTO: 0.05 THOUSANDS/ΜL (ref 0–0.1)
BASOPHILS NFR BLD AUTO: 0 % (ref 0–1)
BILIRUB UR QL STRIP: NEGATIVE
BUN SERPL-MCNC: 12 MG/DL (ref 5–25)
BUN SERPL-MCNC: 12 MG/DL (ref 5–25)
BUN SERPL-MCNC: 13 MG/DL (ref 5–25)
BUN SERPL-MCNC: 17 MG/DL (ref 5–25)
CA-I BLD-SCNC: 1.17 MMOL/L (ref 1.12–1.32)
CA-I BLD-SCNC: 1.22 MMOL/L (ref 1.12–1.32)
CALCIUM SERPL-MCNC: 8.6 MG/DL (ref 8.3–10.1)
CALCIUM SERPL-MCNC: 8.7 MG/DL (ref 8.3–10.1)
CALCIUM SERPL-MCNC: 8.7 MG/DL (ref 8.3–10.1)
CALCIUM SERPL-MCNC: 8.8 MG/DL (ref 8.3–10.1)
CHLORIDE SERPL-SCNC: 101 MMOL/L (ref 100–108)
CHLORIDE SERPL-SCNC: 101 MMOL/L (ref 100–108)
CHLORIDE SERPL-SCNC: 103 MMOL/L (ref 100–108)
CHLORIDE SERPL-SCNC: 103 MMOL/L (ref 100–108)
CHLORIDE UR-SCNC: 21 MMOL/L (ref 10–330)
CLARITY UR: CLEAR
CO2 SERPL-SCNC: 25 MMOL/L (ref 21–32)
CO2 SERPL-SCNC: 26 MMOL/L (ref 21–32)
CO2 SERPL-SCNC: 26 MMOL/L (ref 21–32)
CO2 SERPL-SCNC: 27 MMOL/L (ref 21–32)
COLOR UR: YELLOW
CREAT SERPL-MCNC: 1.1 MG/DL (ref 0.6–1.3)
CREAT SERPL-MCNC: 1.14 MG/DL (ref 0.6–1.3)
CREAT SERPL-MCNC: 1.39 MG/DL (ref 0.6–1.3)
CREAT SERPL-MCNC: 1.59 MG/DL (ref 0.6–1.3)
CREAT UR-MCNC: 34.4 MG/DL
EOSINOPHIL # BLD AUTO: 0.23 THOUSAND/ΜL (ref 0–0.61)
EOSINOPHIL NFR BLD AUTO: 2 % (ref 0–6)
ERYTHROCYTE [DISTWIDTH] IN BLOOD BY AUTOMATED COUNT: 13 % (ref 11.6–15.1)
GFR SERPL CREATININE-BSD FRML MDRD: 52 ML/MIN/1.73SQ M
GFR SERPL CREATININE-BSD FRML MDRD: 61 ML/MIN/1.73SQ M
GFR SERPL CREATININE-BSD FRML MDRD: 78 ML/MIN/1.73SQ M
GFR SERPL CREATININE-BSD FRML MDRD: 81 ML/MIN/1.73SQ M
GLUCOSE SERPL-MCNC: 121 MG/DL (ref 65–140)
GLUCOSE SERPL-MCNC: 150 MG/DL (ref 65–140)
GLUCOSE SERPL-MCNC: 160 MG/DL (ref 65–140)
GLUCOSE SERPL-MCNC: 169 MG/DL (ref 65–140)
GLUCOSE SERPL-MCNC: 182 MG/DL (ref 65–140)
GLUCOSE SERPL-MCNC: 274 MG/DL (ref 65–140)
GLUCOSE SERPL-MCNC: 291 MG/DL (ref 65–140)
GLUCOSE UR STRIP-MCNC: ABNORMAL MG/DL
HCT VFR BLD AUTO: 30.8 % (ref 36.5–49.3)
HGB BLD-MCNC: 10.9 G/DL (ref 12–17)
HGB UR QL STRIP.AUTO: ABNORMAL
KETONES UR STRIP-MCNC: NEGATIVE MG/DL
LEUKOCYTE ESTERASE UR QL STRIP: ABNORMAL
LITHIUM SERPL-SCNC: 1 MMOL/L (ref 0.5–1)
LITHIUM SERPL-SCNC: 1.2 MMOL/L (ref 0.5–1)
LYMPHOCYTES # BLD AUTO: 2.32 THOUSANDS/ΜL (ref 0.6–4.47)
LYMPHOCYTES NFR BLD AUTO: 16 % (ref 14–44)
MAGNESIUM SERPL-MCNC: 1.6 MG/DL (ref 1.6–2.6)
MAGNESIUM SERPL-MCNC: 2 MG/DL (ref 1.6–2.6)
MAGNESIUM SERPL-MCNC: 2.1 MG/DL (ref 1.6–2.6)
MCH RBC QN AUTO: 29.3 PG (ref 26.8–34.3)
MCHC RBC AUTO-ENTMCNC: 35.4 G/DL (ref 31.4–37.4)
MCV RBC AUTO: 83 FL (ref 82–98)
MONOCYTES # BLD AUTO: 1.33 THOUSAND/ΜL (ref 0.17–1.22)
MONOCYTES NFR BLD AUTO: 9 % (ref 4–12)
NEUTROPHILS # BLD AUTO: 10.58 THOUSANDS/ΜL (ref 1.85–7.62)
NEUTS SEG NFR BLD AUTO: 72 % (ref 43–75)
NITRITE UR QL STRIP: NEGATIVE
NON-SQ EPI CELLS URNS QL MICRO: ABNORMAL /HPF
NRBC BLD AUTO-RTO: 0 /100 WBCS
OSMOLALITY UR: 293 MMOL/KG
OSMOLALITY UR: 296 MMOL/KG
P AXIS: 42 DEGREES
PH UR STRIP.AUTO: 6 [PH] (ref 4.5–8)
PLATELET # BLD AUTO: 378 THOUSANDS/UL (ref 149–390)
PMV BLD AUTO: 9.2 FL (ref 8.9–12.7)
POTASSIUM SERPL-SCNC: 3.9 MMOL/L (ref 3.5–5.3)
POTASSIUM SERPL-SCNC: 3.9 MMOL/L (ref 3.5–5.3)
POTASSIUM SERPL-SCNC: 4 MMOL/L (ref 3.5–5.3)
POTASSIUM SERPL-SCNC: 4.2 MMOL/L (ref 3.5–5.3)
POTASSIUM UR-SCNC: 6.3 MMOL/L (ref 1–300)
PR INTERVAL: 222 MS
PROT UR STRIP-MCNC: NEGATIVE MG/DL
QRS AXIS: 67 DEGREES
QRSD INTERVAL: 128 MS
QT INTERVAL: 564 MS
QTC INTERVAL: 572 MS
RBC # BLD AUTO: 3.72 MILLION/UL (ref 3.88–5.62)
RBC #/AREA URNS AUTO: ABNORMAL /HPF
SODIUM 24H UR-SCNC: 21 MOL/L
SODIUM SERPL-SCNC: 134 MMOL/L (ref 136–145)
SODIUM SERPL-SCNC: 134 MMOL/L (ref 136–145)
SODIUM SERPL-SCNC: 135 MMOL/L (ref 136–145)
SODIUM SERPL-SCNC: 136 MMOL/L (ref 136–145)
SP GR UR STRIP.AUTO: <=1.005 (ref 1–1.03)
T WAVE AXIS: 46 DEGREES
UROBILINOGEN UR QL STRIP.AUTO: 0.2 E.U./DL
VENTRICULAR RATE: 62 BPM
WBC # BLD AUTO: 14.63 THOUSAND/UL (ref 4.31–10.16)
WBC #/AREA URNS AUTO: ABNORMAL /HPF

## 2017-10-23 PROCEDURE — G8979 MOBILITY GOAL STATUS: HCPCS

## 2017-10-23 PROCEDURE — 82570 ASSAY OF URINE CREATININE: CPT | Performed by: INTERNAL MEDICINE

## 2017-10-23 PROCEDURE — 97163 PT EVAL HIGH COMPLEX 45 MIN: CPT

## 2017-10-23 PROCEDURE — 83735 ASSAY OF MAGNESIUM: CPT | Performed by: NURSE PRACTITIONER

## 2017-10-23 PROCEDURE — 83935 ASSAY OF URINE OSMOLALITY: CPT | Performed by: INTERNAL MEDICINE

## 2017-10-23 PROCEDURE — 80048 BASIC METABOLIC PNL TOTAL CA: CPT | Performed by: INTERNAL MEDICINE

## 2017-10-23 PROCEDURE — G8978 MOBILITY CURRENT STATUS: HCPCS

## 2017-10-23 PROCEDURE — 84300 ASSAY OF URINE SODIUM: CPT | Performed by: INTERNAL MEDICINE

## 2017-10-23 PROCEDURE — 80048 BASIC METABOLIC PNL TOTAL CA: CPT | Performed by: NURSE PRACTITIONER

## 2017-10-23 PROCEDURE — 80178 ASSAY OF LITHIUM: CPT | Performed by: NURSE PRACTITIONER

## 2017-10-23 PROCEDURE — 82436 ASSAY OF URINE CHLORIDE: CPT | Performed by: INTERNAL MEDICINE

## 2017-10-23 PROCEDURE — 81001 URINALYSIS AUTO W/SCOPE: CPT | Performed by: INTERNAL MEDICINE

## 2017-10-23 PROCEDURE — 85025 COMPLETE CBC W/AUTO DIFF WBC: CPT | Performed by: NURSE PRACTITIONER

## 2017-10-23 PROCEDURE — 82948 REAGENT STRIP/BLOOD GLUCOSE: CPT

## 2017-10-23 PROCEDURE — 84133 ASSAY OF URINE POTASSIUM: CPT | Performed by: INTERNAL MEDICINE

## 2017-10-23 PROCEDURE — 82330 ASSAY OF CALCIUM: CPT | Performed by: NURSE PRACTITIONER

## 2017-10-23 RX ORDER — ATORVASTATIN CALCIUM 40 MG/1
40 TABLET, FILM COATED ORAL DAILY
Status: DISCONTINUED | OUTPATIENT
Start: 2017-10-24 | End: 2017-10-24 | Stop reason: HOSPADM

## 2017-10-23 RX ORDER — MAGNESIUM SULFATE HEPTAHYDRATE 40 MG/ML
4 INJECTION, SOLUTION INTRAVENOUS ONCE
Status: COMPLETED | OUTPATIENT
Start: 2017-10-23 | End: 2017-10-23

## 2017-10-23 RX ORDER — LITHIUM CARBONATE 300 MG/1
300 CAPSULE ORAL
Status: DISCONTINUED | OUTPATIENT
Start: 2017-10-23 | End: 2017-10-24 | Stop reason: HOSPADM

## 2017-10-23 RX ORDER — PANTOPRAZOLE SODIUM 40 MG/1
40 TABLET, DELAYED RELEASE ORAL DAILY
Status: DISCONTINUED | OUTPATIENT
Start: 2017-10-24 | End: 2017-10-24 | Stop reason: HOSPADM

## 2017-10-23 RX ORDER — FENOFIBRATE 145 MG/1
145 TABLET, COATED ORAL DAILY
Status: DISCONTINUED | OUTPATIENT
Start: 2017-10-24 | End: 2017-10-24 | Stop reason: HOSPADM

## 2017-10-23 RX ADMIN — RISPERIDONE 2 MG: 1 TABLET, FILM COATED ORAL at 09:04

## 2017-10-23 RX ADMIN — PIPERACILLIN SODIUM,TAZOBACTAM SODIUM 3.38 G: 3; .375 INJECTION, POWDER, FOR SOLUTION INTRAVENOUS at 11:45

## 2017-10-23 RX ADMIN — METRONIDAZOLE 500 MG: 500 INJECTION, SOLUTION INTRAVENOUS at 05:16

## 2017-10-23 RX ADMIN — RISPERIDONE 2 MG: 1 TABLET, FILM COATED ORAL at 17:15

## 2017-10-23 RX ADMIN — CITALOPRAM HYDROBROMIDE 20 MG: 20 TABLET ORAL at 09:03

## 2017-10-23 RX ADMIN — PIPERACILLIN SODIUM,TAZOBACTAM SODIUM 3.38 G: 3; .375 INJECTION, POWDER, FOR SOLUTION INTRAVENOUS at 04:34

## 2017-10-23 RX ADMIN — HEPARIN SODIUM 5000 UNITS: 5000 INJECTION, SOLUTION INTRAVENOUS; SUBCUTANEOUS at 13:04

## 2017-10-23 RX ADMIN — INSULIN LISPRO 2 UNITS: 100 INJECTION, SOLUTION INTRAVENOUS; SUBCUTANEOUS at 21:52

## 2017-10-23 RX ADMIN — HEPARIN SODIUM 5000 UNITS: 5000 INJECTION, SOLUTION INTRAVENOUS; SUBCUTANEOUS at 21:53

## 2017-10-23 RX ADMIN — MAGNESIUM SULFATE HEPTAHYDRATE 4 G: 40 INJECTION, SOLUTION INTRAVENOUS at 18:22

## 2017-10-23 RX ADMIN — METRONIDAZOLE 500 MG: 500 INJECTION, SOLUTION INTRAVENOUS at 12:58

## 2017-10-23 RX ADMIN — INSULIN LISPRO 1 UNITS: 100 INJECTION, SOLUTION INTRAVENOUS; SUBCUTANEOUS at 17:16

## 2017-10-23 RX ADMIN — HEPARIN SODIUM 5000 UNITS: 5000 INJECTION, SOLUTION INTRAVENOUS; SUBCUTANEOUS at 05:18

## 2017-10-23 RX ADMIN — INSULIN LISPRO 1 UNITS: 100 INJECTION, SOLUTION INTRAVENOUS; SUBCUTANEOUS at 09:04

## 2017-10-23 RX ADMIN — LITHIUM CARBONATE 300 MG: 300 CAPSULE, GELATIN COATED ORAL at 19:54

## 2017-10-23 NOTE — PHYSICAL THERAPY NOTE
Physical Therapy Evaluation    Patient's Name: Michelle Ortiz    Admitting Diagnosis  Dehydration [E86 0]  Hypokalemia [E87 6]  Neck pain [M54 2]  Gastroenteritis [K52 9]  Hyponatremia [E87 1]  DARIN (acute kidney injury) ([facility] ) [N17 9]    Problem List  Patient Active Problem List   Diagnosis    Hypokalemia    Dehydration    Gastroenteritis    Hyponatremia    Type 2 diabetes mellitus ([place] [address] )    DARIN (acute kidney injury) ([place] [address] )    Lithium toxicity       Past Medical History  Past Medical History:   Diagnosis Date    Diabetes mellitus ([place] [address] )     Psychiatric disorder     schizophrenia, depression       Past Surgical History  Past Surgical History:   Procedure Laterality Date    UPPER GASTROINTESTINAL ENDOSCOPY          10/23/17 1330   Note Type   Note type Eval/Treat   Pain Assessment   Pain Assessment No/denies pain   Pain Score No Pain   Home Living   Type of Home Apartment  (condo)   Home Layout One level;Performs ADLs on one level; Able to live on main level with bedroom/bathroom;Stairs to enter with rails  (2 ARETHA w/ HR)   Bathroom Shower/Tub Tub/shower unit   Bathroom Toilet Standard   Bathroom Equipment (no DME)   [address] [facility] (no AD At baseline)   Prior Function   Level of [place] Independent with ADLs and functional mobility   Lives With Family  (parents)   ADL Assistance Independent   Falls in the last 6 months 0   Vocational Unemployed   Comments Pt can cook, does own laundry   Restrictions/Precautions   [employer] [place] Bearing Precautions Per Order No   Braces or Orthoses (none per pt's chart)   Other Precautions Fall Risk;Seizure;Multiple lines  (urinary catheter)   General   Family/Caregiver Present Yes  (pt's father)   Cognition   Arousal/Participation Alert   Orientation Level Oriented X4   Memory Within functional limits   Following Commands Follows all commands and directions without difficulty   Comments somewhat flat affect   RUE Assessment   RUE Assessment WFL  ( strength full; AROM WFL)   RUE Strength   RUE Overall Strength Within Functional Limits - able to perform ADL tasks with strength   LUE Assessment   LUE Assessment WFL  ( strength full; AROM WFL)   LUE Strength   LUE Overall Strength Within Functional Limits - able to perform ADL tasks with strength   RLE Assessment   RLE Assessment WFL  (MMT grossly 4+/5 throughout)   LLE Assessment   LLE Assessment WFL  (MMT grossly 4+/5 throughout)   Coordination   Movements are Fluid and Coordinated 1  (finger opposition intact B hands)   Sensation WFL  (pt denies N/T)   Light Touch   RLE Light Touch Grossly intact   LLE Light Touch Grossly intact   Bed Mobility   Rolling R Unable to assess  (pt received OOB in recliner)   Transfers   Sit to Stand 5  Supervision   Additional items Armrests; Verbal cues   Stand to Sit 5  Supervision   Additional items Verbal cues   Ambulation/Elevation   Gait pattern Decreased foot clearance;Shuffling; Wide ZEESHAN; Short stride   Gait Assistance 5  Supervision  (SBA)   Additional items Assist x 1;Verbal cues   Assistive Device Other (Comment)  (U/L UE support IV pole)   Distance 75'   Balance   Static Sitting Good   Dynamic Sitting Fair +   Static Standing Fair +   Dynamic Standing Fair   Ambulatory Fair   Endurance Deficit   Endurance Deficit Yes   Activity Tolerance   Activity Tolerance Patient tolerated treatment well;Patient limited by fatigue   Nurse Made Aware Yes, RN Stephanie Hendricks verbalized pt appropriate for PT session, made aware of outcomes/recs   Assessment   Prognosis Good   Problem List Decreased strength;Decreased endurance; Impaired balance;Decreased mobility   Assessment Pt is 42 y o  male seen for PT evaluation on 10/23/2017 s/p admit to [facility] on 10/20/2017 w/ Hyponatremia; pt presented to ED w/ N/V/D  PT consulted to assess pt's functional mobility and d/c needs  Order placed for PT eval and tx   Comorbidities affecting pt's physical performance at time of assessment include: DM type 2, schizophrenia, lithium toxicity, DARIN  PTA, pt was independent w/ all functional mobility w/ no AD or DME, ambulates community distances and elevations, has 2 ARETHA, unemployed and lives w/ parents in 1 level condo  Pt independent w/ ADLs  Personal factors affecting pt at time of IE include: stairs to enter home, decreased initiation and engagement and limited insight into impairments  Please find objective findings from PT assessment regarding body systems outlined above with impairments and limitations including weakness, impaired balance, decreased endurance, decreased activity tolerance and fall risk, as well as mobility assessment (need for supervision level of assist w/ all phases of mobility when usually ambulating independently and tolerance to only 75 feet of ambulation)  The following objective measures performed on IE also reveal limitations: Barthel Index: 60/100 and Modified Leon: 3 (moderate disability)  Pt to benefit from continued PT tx to address deficits as defined above and maximize level of functional independent mobility and consistency  From PT/mobility standpoint, recommendation at time of d/c would be Home PT vs no needs, pending progress in order to facilitate return to PLOF     Barriers to Discharge None   Goals   Patient Goals "to return home"   STG Expiration Date 11/02/17   Short Term Goal #1 In 7-10 days: Increase bilateral LE strength 1/2 grade to facilitate independent mobility, Perform all transfers independently to improve independence, Ambulate > 250 ft  with least restrictive assistive device independently w/o LOB and w/ normalized gait pattern 100% of the time, Navigate 2 stairs modified independent with unilateral handrail to facilitate return to previous living environment, Increase all balance 1/2 grade to decrease risk for falls, Complete exercise program independently and Tolerate 4 hr OOB to faciliate upright tolerance   Treatment Day 0   Plan Treatment/Interventions Functional transfer training;LE strengthening/ROM; Elevations; Therapeutic exercise; Endurance training;Patient/family training;Equipment eval/education;Gait training;Spoke to nursing;Spoke to advanced practitioner   PT Frequency (3-5x/wk)   Recommendation   Recommendation Home PT; Home with family support  (HHPT vs  anticipate no needs)   Equipment Recommended (none at this time)   PT - OK to Discharge Yes  (when medically cleared)   Modified Kyle Scale   Modified Kyle Scale 3   Barthel Index   Feeding 10   Bathing 5   Grooming Score 5   Dressing Score 10   Bladder Score 0   Bowels Score 10   Toilet Use Score 10   Transfers (Bed/Chair) Score 10   Mobility (Level Surface) Score 0   Stairs Score 0   Barthel Index Score 60         Jeff Hudson, PT

## 2017-10-23 NOTE — PROGRESS NOTES
NEPHROLOGY PROGRESS NOTE    Patient: Piero Valdivia               Sex: male          DOA: 10/20/2017  5:26 PM   YOB: 1970        Age:  42 y o         LOS:  LOS: 3 days       HPI     Patient with acute kidney injury possibly secondary to lithium nephrotoxicity    SUBJECTIVE     Feeling much better  Wants to get Lyon catheter out  Diuresing very well      CURRENT MEDICATIONS       Current Facility-Administered Medications:     citalopram (CeleXA) tablet 20 mg, 20 mg, Oral, Daily, Juancho Hickman MD, 20 mg at 10/23/17 0903    heparin (porcine) subcutaneous injection 5,000 Units, 5,000 Units, Subcutaneous, Q8H [address], 5,000 Units at 10/23/17 1304 **AND** Platelet count, , , Once, Time ZOE Alonso    insulin lispro (HumaLOG) 100 units/mL subcutaneous injection 1-5 Units, 1-5 Units, Subcutaneous, TID AC, 1 Units at 10/23/17 0904 **AND** Fingerstick Glucose (POCT), , , TID AC, ZOE Velazco    insulin lispro (HumaLOG) 100 units/mL subcutaneous injection 1-5 Units, 1-5 Units, Subcutaneous, HS, ZOE Velazco, 1 Units at 10/22/17 2133    metroNIDAZOLE (FLAGYL) IVPB (premix) 500 mg, 500 mg, Intravenous, Q8H, ZOE Dumont, Last Rate: 200 mL/hr at 10/23/17 1258, 500 mg at 10/23/17 1258    ondansetron (ZOFRAN) injection 4 mg, 4 mg, Intravenous, Once, Jose Guerin DO, Stopped at 10/20/17 1830    piperacillin-tazobactam (ZOSYN) 3 375 g in sodium chloride 0 9 % 50 mL IVPB, 3 375 g, Intravenous, Q6H, Time ZOE Alonso, Last Rate: 100 mL/hr at 10/23/17 1145, 3 375 g at 10/23/17 1145    potassium chloride 10 % oral solution 40 mEq, 40 mEq, Oral, Once, ZOE Velazco    risperiDONE (RisperDAL) tablet 2 mg, 2 mg, Oral, BID, ZOE Farley, 2 mg at 10/23/17 0904    OBJECTIVE     Current Weight: Weight - Scale: (!) 154 kg (339 lb 15 2 oz)  Vitals:    10/23/17 0732   BP: 126/82   Pulse: 58   Resp: 17   Temp: 98 2 °F (36 8 °C) SpO2:        Intake/Output Summary (Last 24 hours) at 10/23/17 1619  Last data filed at 10/23/17 1258   Gross per 24 hour   Intake          3021 67 ml   Output             8400 ml   Net         -5378 33 ml       PHYSICAL EXAMINATION     Physical Exam:   Eyes:  Pupils equally react to light  ENT:  Moist tongue  Gen:  Not in any acute distress  Neck:  Supple  Chest:  Clear  Cor:  S1-S2 normal  Abdomen:  Soft nontender  Ext:  No swelling  Neuro:  Awake and alert  Skin:  Good skin turgor      LAB RESULTS       Results from last 7 days  Lab Units 10/23/17  0423 10/22/17  2340 10/22/17  1625 10/22/17  0814 10/22/17  0435 10/22/17  0434 10/22/17  0117 10/21/17  2127  10/21/17  0928 10/21/17  0506  10/20/17  2235 10/20/17  1738   WBC Thousand/uL 14 63*  --   --   --  13 27*  --   --   --   --   --  17 28*  --   --  19 93*   HEMOGLOBIN g/dL 10 9*  --   --   --  10 3*  --   --   --   --   --  11 8*  --   --  12 8   HEMATOCRIT % 30 8*  --   --   --  28 7*  --   --   --   --   --  31 8*  --   --  33 7*   PLATELETS Thousands/uL 378  --   --   --  339  --   --   --   --   --  401*  --  392* 486*   SODIUM mmol/L 136 134* 132* 128*  --  126* 123* 121*  < >  --  118*  119*  < > 117* 116*   POTASSIUM mmol/L 4 2 3 9 4 2 4 1  --  3 6 3 3* 3 7  < >  --  3 0*  3 3*  < > 2 3* 2 2*   CHLORIDE mmol/L 103 103 101 98*  --  96* 93* 91*  < >  --  86*  86*  < > 83* 80*   CO2 mmol/L 27 26 25 23  --  23 23 21  < >  --  21  20*  < > 22 22   BUN mg/dL 13 17 21 27*  --  31* 34* 37*  < >  --  45*  46*  < > 45* 44*   CREATININE mg/dL 1 39* 1 59* 2 11* 2 56*  --  2 97* 3 47* 4 16*  < >  --  6 67*  6 70*  < > 6 73* 6 50*   CALCIUM mg/dL 8 8 8 7 8 6 8 3  --  8 1* 8 0* 8 0*  < >  --  8 5  8 5  < > 8 6 9 6   MAGNESIUM mg/dL 2 0 2 1 2 2 2 2  --  2 1 2 1 2 1  < >  --  1 9  2 0  < > 2 0  --    ALBUMIN g/dL  --   --   --   --   --   --   --   --   --  2 9*  --   --   --  4 1   TOTAL PROTEIN g/dL  --   --   --   --   --   --   --   --   --  5 7*  -- --   --  7 6   GLUCOSE RANDOM mg/dL 160* 150* 164* 140  --  129 124 120  < >  --  80  77  < > 73 99   < > = values in this interval not displayed  RADIOLOGY RESULTS      Results for orders placed during the hospital encounter of 10/20/17   XR chest 1 view portable    Narrative CHEST  PORTABLE    INDICATION: 82-year-old male, fatigue, nausea, vomiting     COMPARISON:  None    VIEWS:   AP frontal; 1 image    FINDINGS:    The cardiomediastinal silhouette is unremarkable  Oblique opacity at right lung base, subsegmental atelectasis versus infiltrate  Continued follow-up recommended  No pleural effusion  Bony thorax unremarkable  Impression Oblique opacity right lung base, subsegmental atelectasis versus infiltrate       Findings are consistent with emergency room physician's preliminary reading      Workstation performed: [id number]       No results found for this or any previous visit  No results found for this or any previous visit  No results found for this or any previous visit  Results for orders placed during the hospital encounter of 10/20/17   CT abdomen pelvis wo contrast    Narrative CT ABDOMEN AND PELVIS WITHOUT IV CONTRAST    INDICATION:  Abdominal pain  Diarrhea  COMPARISON: None  TECHNIQUE:  CT examination of the abdomen and pelvis was performed without intravenous contrast   Reformatted images were created in axial, sagittal, and coronal planes  Radiation dose length product (DLP) for this visit:  1500 mGy-cm   This examination, like all CT scans performed in the [facility], was performed utilizing techniques to minimize radiation dose exposure, including the use of iterative   reconstruction and automated exposure control  Enteric contrast was administered  FINDINGS:    ABDOMEN    LOWER CHEST:  There is bibasilar subsegmental atelectasis with trace bilateral pleural effusions  LIVER/BILIARY TREE:  Unremarkable      GALLBLADDER:  Gallbladder is surgically absent  SPLEEN:  Unremarkable  PANCREAS:  Unremarkable  ADRENAL GLANDS:  There is a 15 mm left adrenal adenoma  The right adrenal gland is unremarkable  KIDNEYS/URETERS:  Unremarkable  No hydronephrosis  STOMACH AND BOWEL:  There are multiple nondilated loops of fluid-filled of small bowel within the lower abdomen and pelvis as well as liquid stool throughout the colon  APPENDIX:  No findings to suggest appendicitis  ABDOMINOPELVIC CAVITY:  No ascites or free intraperitoneal air  No lymphadenopathy  VESSELS:  Unremarkable for patient's age  PELVIS    REPRODUCTIVE ORGANS:  Unremarkable for patient's age  URINARY BLADDER:  Unremarkable  ABDOMINAL WALL/INGUINAL REGIONS:  There are tiny bilateral fat-containing inguinal hernias  OSSEOUS STRUCTURES:  No acute fracture or destructive osseous lesion  Impression Multiple nondilated fluid-filled loops of lower abdominal small bowel with liquid stool noted throughout the colon suggestive of a gastroenteritis/diarrheal disease  There is no significant colonic wall thickening or pericolonic inflammatory stranding  No evidence of large or small bowel obstruction  No free air or free fluid  Small partially visualized bilateral pleural effusions with posterior bibasilar atelectasis  Workstation performed: [id number]       No results found for this or any previous visit  PLAN / RECOMMENDATIONS      Acute kidney injury:  Likely secondary to ATN from lithium toxicity  Improving with good diuresis    Polyuria:  Either it is diuretic phase of ATN versus nephrogenic DI because of lithium  Normal to low sodium goes against DI do patient is drinking lots of liquid  I will check urine osmolarity and if necessary will do water restriction test     Will continue to follow    Sanford Baumgarten, MD  Nephrology  10/23/2017        Portions of the record may have been created with voice recognition software   Occasional wrong word or "sound a like" substitutions may have occurred due to the inherent limitations of voice recognition software  Read the chart carefully and recognize, using context, where substitutions have occurred

## 2017-10-23 NOTE — PROGRESS NOTES
Transfer Note - ICU Transfer to SD/MS tele   Edel Crow 42 y o  male MRN: [id number]  [address]   Unit/Bed#:  Encounter: [id number]    Code Status: Level 1 - Full Code  POA:    POLST:      Reason for ICU adm: Lithium toxicity    Active problems:   Principal Problem:    Hyponatremia  Active Problems:    Hypokalemia    Dehydration    Gastroenteritis    Type 2 diabetes mellitus ([facility] [address] )    DARIN (acute kidney injury) ([facility] )    Lithium toxicity  Resolved Problems:    * No resolved hospital problems  *      Consultants: Nephrology, psychiatry    History of Present Illness: Patient is a 42year old male presenting with a several week history of nausea, vomiting, and diarrhea  He has a past medical history of diabetes and schizophrenia on lithium  In the emergency room he had significant metabolic derangements and was referred to the stepdown unit for admission  Summary of clinical course: He was significantly fluid resuscitated and his laboratory work-up demonstrated an elevated lithium level  He was treated with copious fluid hydration and frequent laboratory monitoring  His derangements improved with aggressive electrolyte replacement and hydration  He had a psychiatry evaluation in which the family reported that his outpatient psychiatrist had been monitoring his levels not as frequently and had started a SSRI which caused elevation in his lithium  He denied any attempts at self-harm and was cleared from a psychiatry perspective  The psychiatry service recommended restarting all his home medications at half-dose  He was seen by the nephrology service for a significant DARIN which has been steadily improving  He is felt to be stable for transfer to a medical floor        Recent or scheduled procedures:   10/21 US Kidney and Bladder- Negative for hydronephrosis, mildly enlarged bilateral kidneys  10/20 CT A/P- Multiple nondilated fluid-filled loops of lower abdominal small bowel with liquid stool noted throughout the colon, small partially visualized bilateral pleural effusions with posterior bibasilar atelectasis    Outstanding/pending diagnostics:   1600 BMP, Mg, Phos, Li level    Cultures:   10/21 Urine- No growth  10/21 Enteric stool culture- Negative  10/21 C diff- Negative  10/20 Blood culture x2- No growth to date  10/21 Ova and parasite- In progress       Mobilization Plan: Out of bed as tolerated    Nutrition Plan: Oral diet    Discharge Plan:   Patient should be ready for discharge to home/rehab after assessment by therapy services, normalization of lithium levels    Initial Physical Therapy Recommendations: Pending  Initial Occupational Therapy Recommendations: Pending  Initial /Plan: Family has requested home health     Specific Diagnosis Plan:  1  [place] toxicity- Improving, continue to trend, restart lithium at half dose when therapeutic  2  Acute kidney injury- Significantly improving, follow intake and output closely, daily weights, trend serum creatinine, replete electrolytes as needed  3  Type 2 diabetes- Continue sliding scale  4  Schizophrenia- Celexa and Risperdal restarted at half previous dose, will restart lithium at half when level improves    Spoke with Dr Messina  regarding transfer  Please call [phone] with any questions or concerns  Portions of the record may have been created with voice recognition software  Occasional wrong word or "sound a like" substitutions may have occurred due to the inherent limitations of voice recognition software  Read the chart carefully and recognize, using context, where substitutions have occurred      ZOE Cam

## 2017-10-23 NOTE — PROGRESS NOTES
The patient chart was reviewed  The labs were reviewed as well  The patient will be transferred to our service  His renal failure is improving  His hyponatremia has improved significantly as well  The patient does have some lithium toxicity as well and will get another repeat lithium level    Which has been ordered by the ICU

## 2017-10-23 NOTE — PLAN OF CARE
Problem: Nutrition/Hydration-ADULT  Goal: Nutrient/Hydration intake appropriate for improving, restoring or maintaining nutritional needs  Collaborate with interdisciplinary team and initiate plan and interventions as ordered  Monitor patient's weight and dietary intake as ordered or per policy  Utilize nutrition screening tool and intervene per policy  Determine patient's food preferences and provide high-protein, high-caloric foods as appropriate       INTERVENTIONS:  - Monitor oral intake, urinary output, labs, and treatment plans  - Assess nutrition and hydration status and recommend course of action  - Evaluate amount of meals eaten  - Assist patient with eating if necessary   - Allow adequate time for meals  - Recommend/ encourage appropriate diets, oral nutritional supplements, and vitamin/mineral supplements  - Order, calculate, and assess calorie counts as needed  - Assess need for intravenous fluids  - Provide specific nutrition/hydration education as appropriate  - Include patient/family/caregiver in decisions related to nutrition    Outcome: Progressing

## 2017-10-23 NOTE — PLAN OF CARE
Problem: PHYSICAL THERAPY ADULT  Goal: Performs mobility at highest level of function for planned discharge setting  See evaluation for individualized goals  Treatment/Interventions: Functional transfer training, LE strengthening/ROM, Elevations, Therapeutic exercise, Endurance training, Patient/family training, Equipment eval/education, Gait training, Spoke to nursing, Spoke to advanced practitioner  Equipment Recommended:  (none at this time)       See flowsheet documentation for full assessment, interventions and recommendations  Prognosis: Good  Problem List: Decreased strength, Decreased endurance, Impaired balance, Decreased mobility  Assessment: Pt is 42 y o  male seen for PT evaluation on 10/23/2017 s/p admit to [facility] on 10/20/2017 w/ Hyponatremia; pt presented to ED w/ N/V/D  PT consulted to assess pt's functional mobility and d/c needs  Order placed for PT eval and tx  Comorbidities affecting pt's physical performance at time of assessment include: DM type 2, schizophrenia, lithium toxicity, DARIN  PTA, pt was independent w/ all functional mobility w/ no AD or DME, ambulates community distances and elevations, has 2 ARETHA, unemployed and lives w/ parents in 1 level condo  Pt independent w/ ADLs  Personal factors affecting pt at time of IE include: stairs to enter home, decreased initiation and engagement and limited insight into impairments  Please find objective findings from PT assessment regarding body systems outlined above with impairments and limitations including weakness, impaired balance, decreased endurance, decreased activity tolerance and fall risk, as well as mobility assessment (need for supervision level of assist w/ all phases of mobility when usually ambulating independently and tolerance to only 75 feet of ambulation)  The following objective measures performed on IE also reveal limitations: Barthel Index: 60/100 and Modified Kyle: 3 (moderate disability)   Pt to benefit from continued PT tx to address deficits as defined above and maximize level of functional independent mobility and consistency  From PT/mobility standpoint, recommendation at time of d/c would be Home PT vs no needs, pending progress in order to facilitate return to PLOF  Barriers to Discharge: None     Recommendation: Home PT, Home with family support (HHPT vs  anticipate no needs)     PT - OK to Discharge: Yes (when medically cleared)    See flowsheet documentation for full assessment

## 2017-10-23 NOTE — PROGRESS NOTES
Progress Note - Critical Care   Hugo Och 42 y o  male MRN: [id number]  Unit/Bed#:  Encounter: [id number]    Attending Physician: Moriah Golden MD      ______________________________________________________________________  Assessment and Plan:   Principal Problem:    Hyponatremia  Active Problems:    Hypokalemia    Dehydration    Gastroenteritis    Type 2 diabetes mellitus ([facility] )    DARIN (acute kidney injury) ([facility] )    Lithium toxicity  Resolved Problems:    * No resolved hospital problems  *        Neuro:  Serial neuro exams  CV:  Hemodynamically stable    Pulm:  Oxygenating well  No issues identified  GI:  Waiting stool cultures  Negative for C diff  :  Acute kidney injury improving with fluid resuscitation  Nephrology following  F/E/N:  Replete electrolytes as needed  Bicarbonate discontinued  Lithium level trending downward  ID:  Leukocytosis improving  Continue Zosyn and Flagyl  Heme: H&H and platelets stable    Endo:  Glycemic monitoring     Msk/Skin:  No issues identified    Psych:  Cleared of suicide precautions by Psychiatry    Disposition:  Continue step-down level of care  Code Status: Level 1 - Full Code    Counseling / Coordination of Care  Total time spent today 30 minutes  Greater than 50% of total time was spent with the patient and / or family counseling and / or coordination of care      ______________________________________________________________________    Chief Complaint:  Nausea vomiting diarrhea times 10 days, lithium toxicity    24 Hour Events:  DARIN improving, lithium level slightly improving  No further vomiting or diarrhea    Hypernatremia improving     ______________________________________________________________________    Physical Exam:   GEN:  Well nourished, well developed, appears stated age, no acute distress  HEENT:  Sclera anicteric, mucous membranes pink and moist, conjunctiva pink, no emily/rhinorrhea  Neck:  No lymphadenopathy, normal ROM, supple, no bruits  CV :  S1S2, no murmurs, rubs or gallops  Intact distal pulses  No JVD  Resp:  Lungs CTA =B/L  No subq air or crepitus  Symmetrical expansion  No cough noted  GI :  Abd soft, nontender, no guarding/rebound, nondistended, normoactive bowel sounds X4 quads, no organomegaly appreciated  Neuro:  CN II-XII grossly intact, nonfocal exam, speech clear, GCS 15        ______________________________________________________________________  Vitals:    10/22/17 1100 10/22/17 1540 10/22/17 1900 10/22/17 2345   BP: 118/69 125/73 120/56    Pulse: 64 69 65    Resp: 19 18 18    Temp: 98 7 °F (37 1 °C) 97 7 °F (36 5 °C) 98 2 °F (36 8 °C) 98 3 °F (36 8 °C)   TempSrc: Oral Oral Oral Oral   SpO2: 95% 99% 98%    Weight:       Height:           Temperature:   Temp (24hrs), Av 3 °F (36 8 °C), Min:97 7 °F (36 5 °C), Max:98 7 °F (37 1 °C)    Current Temperature: 98 2 °F (36 8 °C)  Weights:   IBW: 86 8 kg    Body mass index is 41 38 kg/m²  Weight (last 2 days)     Date/Time   Weight    10/22/17 0538  (!)  154 (339 95)    10/21/17 0540  (!)  154 (339 07)            Hemodynamic Monitoring:  N/A, PAP:       Non-Invasive/Invasive Ventilation Settings:  Respiratory    Lab Data (Last 4 hours)    None         O2/Vent Data (Last 4 hours)    None              No results found for: PHART, NJN8DMD, PO2ART, VWB7XHF, J0KYDTHD, BEART, SOURCE  SpO2: SpO2: 98 %  Intake and Outputs:  I/O       10/21 0701 - 10/22 0700 10/22 07 - 10/23 0700    P  O  1890 1457    I V  (mL/kg) 2854 2 (18 5) 1318 3 (8 6)    IV Piggyback 1900 400    Total Intake(mL/kg) 6644 2 (43 1) 3175 3 (20 6)    Urine (mL/kg/hr) 9450 (2 6) 59165 (2 9)    Stool 0 (0) 800 (0 2)    Total Output 9450 52371    Net -2801 8 -7174 7          Unmeasured Stool Occurrence 2 x 1 x        Nutrition:        Diet Orders            Start     Ordered    10/22/17 1447  Diet Regular; Regular House  Diet effective now     Question Answer Comment   Diet Type Regular    Regular Regular House    RD to adjust diet per protocol? Yes        10/22/17 1446          Labs:     Results from last 7 days  Lab Units 10/23/17  0423 10/22/17  0435 10/21/17  0506   WBC Thousand/uL 14 63* 13 27* 17 28*   HEMOGLOBIN g/dL 10 9* 10 3* 11 8*   HEMATOCRIT % 30 8* 28 7* 31 8*   PLATELETS Thousands/uL 378 339 401*   NEUTROS PCT % 72 77* 79*   MONOS PCT % 9 10 8       Results from last 7 days  Lab Units 10/23/17  0423 10/22/17  2340 10/22/17  1625  10/21/17  0928  10/20/17  1738   SODIUM mmol/L 136 134* 132*  < >  --   < > 116*   POTASSIUM mmol/L 4 2 3 9 4 2  < >  --   < > 2 2*   CHLORIDE mmol/L 103 103 101  < >  --   < > 80*   CO2 mmol/L 27 26 25  < >  --   < > 22   BUN mg/dL 13 17 21  < >  --   < > 44*   CREATININE mg/dL 1 39* 1 59* 2 11*  < >  --   < > 6 50*   CALCIUM mg/dL 8 8 8 7 8 6  < >  --   < > 9 6   TOTAL PROTEIN g/dL  --   --   --   --  5 7*  --  7 6   BILIRUBIN TOTAL mg/dL  --   --   --   --  0 60  --  0 60   ALK PHOS U/L  --   --   --   --  98  --  112   ALT U/L  --   --   --   --  28  --  36   AST U/L  --   --   --   --  20  --  26   GLUCOSE RANDOM mg/dL 160* 150* 164*  < >  --   < > 99   < > = values in this interval not displayed  Results from last 7 days  Lab Units 10/23/17  0423 10/22/17  2340 10/22/17  1625   MAGNESIUM mg/dL 2 0 2 1 2 2     No results found for: PHOS       No results found for: TROPONINI    Results from last 7 days  Lab Units 10/20/17  2102 10/20/17  1738   LACTIC ACID mmol/L 0 8 2 0     ABG:No results found for: PHART, HDE1XOG, PO2ART, LAO5HLS, Y6TQEECN, BEART, SOURCE  Imaging:  None today  EKG:  Sinus Faythe Otto  Micro:  Lab Results   Component Value Date    BLOODCX No Growth at 24 hrs  10/20/2017    BLOODCX No Growth at 24 hrs   10/20/2017    URINECX No Growth <1000 cfu/mL 10/21/2017     Allergies: No Known Allergies  Medications:   Scheduled Meds:  citalopram 20 mg Oral Daily   heparin (porcine) 5,000 Units Subcutaneous Q8H [address]   insulin lispro 1-5 Units Subcutaneous TID AC insulin lispro 1-5 Units Subcutaneous HS   metroNIDAZOLE 500 mg Intravenous Q8H   ondansetron 4 mg Intravenous Once   piperacillin-tazobactam 3 375 g Intravenous Q6H   potassium chloride 40 mEq Oral Once   risperiDONE 2 mg Oral BID     Continuous Infusions:   PRN Meds:     VTE Pharmacologic Prophylaxis: Heparin  VTE Mechanical Prophylaxis: sequential compression device  Invasive lines and devices: Invasive Devices     Peripheral Intravenous Line            Peripheral IV 10/20/17 Left Hand 2 days    Peripheral IV 10/21/17 Left Antecubital 2 days    Peripheral IV 10/21/17 Right Antecubital 1 day          Drain            Urethral Catheter 16 Fr  2 days                     Portions of the record may have been created with voice recognition software  Occasional wrong word or "sound a like" substitutions may have occurred due to the inherent limitations of voice recognition software  Read the chart carefully and recognize, using context, where substitutions have occurred      ZOE Tang

## 2017-10-24 VITALS
RESPIRATION RATE: 16 BRPM | HEIGHT: 76 IN | WEIGHT: 315 LBS | BODY MASS INDEX: 38.36 KG/M2 | HEART RATE: 56 BPM | SYSTOLIC BLOOD PRESSURE: 140 MMHG | DIASTOLIC BLOOD PRESSURE: 81 MMHG | TEMPERATURE: 97.2 F | OXYGEN SATURATION: 97 %

## 2017-10-24 LAB
ANION GAP SERPL CALCULATED.3IONS-SCNC: 6 MMOL/L (ref 4–13)
BUN SERPL-MCNC: 9 MG/DL (ref 5–25)
CALCIUM SERPL-MCNC: 8.7 MG/DL (ref 8.3–10.1)
CHLORIDE SERPL-SCNC: 102 MMOL/L (ref 100–108)
CO2 SERPL-SCNC: 27 MMOL/L (ref 21–32)
CREAT SERPL-MCNC: 1.08 MG/DL (ref 0.6–1.3)
GFR SERPL CREATININE-BSD FRML MDRD: 83 ML/MIN/1.73SQ M
GLUCOSE SERPL-MCNC: 184 MG/DL (ref 65–140)
GLUCOSE SERPL-MCNC: 204 MG/DL (ref 65–140)
GLUCOSE SERPL-MCNC: 275 MG/DL (ref 65–140)
LITHIUM SERPL-SCNC: 1 MMOL/L (ref 0.5–1)
OSMOLALITY UR: 269 MMOL/KG
POTASSIUM SERPL-SCNC: 3.9 MMOL/L (ref 3.5–5.3)
SODIUM SERPL-SCNC: 135 MMOL/L (ref 136–145)

## 2017-10-24 PROCEDURE — 80178 ASSAY OF LITHIUM: CPT | Performed by: NURSE PRACTITIONER

## 2017-10-24 PROCEDURE — 80048 BASIC METABOLIC PNL TOTAL CA: CPT | Performed by: NURSE PRACTITIONER

## 2017-10-24 PROCEDURE — 82948 REAGENT STRIP/BLOOD GLUCOSE: CPT

## 2017-10-24 RX ORDER — GLIPIZIDE 5 MG/1
10 TABLET ORAL
Status: DISCONTINUED | OUTPATIENT
Start: 2017-10-24 | End: 2017-10-24 | Stop reason: HOSPADM

## 2017-10-24 RX ORDER — METFORMIN HYDROCHLORIDE 500 MG/1
1000 TABLET, EXTENDED RELEASE ORAL
Status: DISCONTINUED | OUTPATIENT
Start: 2017-10-24 | End: 2017-10-24 | Stop reason: HOSPADM

## 2017-10-24 RX ORDER — LITHIUM CARBONATE 300 MG/1
300 CAPSULE ORAL
Qty: 30 CAPSULE | Refills: 0 | Status: SHIPPED | OUTPATIENT
Start: 2017-10-24

## 2017-10-24 RX ORDER — CITALOPRAM 20 MG/1
20 TABLET ORAL DAILY
Qty: 30 TABLET | Refills: 0 | Status: SHIPPED | OUTPATIENT
Start: 2017-10-25

## 2017-10-24 RX ORDER — ATORVASTATIN CALCIUM 40 MG/1
40 TABLET, FILM COATED ORAL DAILY
Qty: 30 TABLET | Refills: 0 | Status: SHIPPED | OUTPATIENT
Start: 2017-10-25

## 2017-10-24 RX ORDER — RISPERIDONE 2 MG/1
2 TABLET, FILM COATED ORAL 2 TIMES DAILY
Qty: 30 TABLET | Refills: 0 | Status: SHIPPED | OUTPATIENT
Start: 2017-10-24

## 2017-10-24 RX ORDER — GLIPIZIDE 10 MG/1
10 TABLET ORAL
Qty: 30 TABLET | Refills: 0 | Status: SHIPPED | OUTPATIENT
Start: 2017-10-24

## 2017-10-24 RX ADMIN — INSULIN LISPRO 3 UNITS: 100 INJECTION, SOLUTION INTRAVENOUS; SUBCUTANEOUS at 08:01

## 2017-10-24 RX ADMIN — LITHIUM CARBONATE 300 MG: 300 CAPSULE, GELATIN COATED ORAL at 12:42

## 2017-10-24 RX ADMIN — RISPERIDONE 2 MG: 1 TABLET, FILM COATED ORAL at 08:00

## 2017-10-24 RX ADMIN — HEPARIN SODIUM 5000 UNITS: 5000 INJECTION, SOLUTION INTRAVENOUS; SUBCUTANEOUS at 05:28

## 2017-10-24 RX ADMIN — PANTOPRAZOLE SODIUM 40 MG: 40 TABLET, DELAYED RELEASE ORAL at 08:00

## 2017-10-24 RX ADMIN — INSULIN LISPRO 3 UNITS: 100 INJECTION, SOLUTION INTRAVENOUS; SUBCUTANEOUS at 12:43

## 2017-10-24 RX ADMIN — FENOFIBRATE 145 MG: 145 TABLET, FILM COATED ORAL at 08:01

## 2017-10-24 RX ADMIN — LITHIUM CARBONATE 300 MG: 300 CAPSULE, GELATIN COATED ORAL at 08:00

## 2017-10-24 RX ADMIN — CITALOPRAM HYDROBROMIDE 20 MG: 20 TABLET ORAL at 08:00

## 2017-10-24 RX ADMIN — ATORVASTATIN CALCIUM 40 MG: 40 TABLET, FILM COATED ORAL at 08:00

## 2017-10-24 NOTE — PROGRESS NOTES
NEPHROLOGY PROGRESS NOTE    Patient: Sweetie Goode               Sex: male          DOA: 10/20/2017  5:26 PM   YOB: 1970        Age:  42 y o         LOS:  LOS: 4 days       HPI     Patient with acute kidney injury and polyuria    SUBJECTIVE     He is feeling quite well  Possible discharge home today    Still diuresing quite well    CURRENT MEDICATIONS       Current Facility-Administered Medications:     atorvastatin (LIPITOR) tablet 40 mg, 40 mg, Oral, Daily, Merry Root, CRNP, 40 mg at 10/24/17 0800    citalopram (CeleXA) tablet 20 mg, 20 mg, Oral, Daily, Dalton Austin MD, 20 mg at 10/24/17 0800    fenofibrate (TRICOR) tablet 145 mg, 145 mg, Oral, Daily, Merry Root, CRNP, 145 mg at 10/24/17 0801    heparin (porcine) subcutaneous injection 5,000 Units, 5,000 Units, Subcutaneous, Q8H [facility], 5,000 Units at 10/24/17 0528 **AND** Platelet count, , , Once, Time ZOE Alonso    insulin lispro (HumaLOG) 100 units/mL subcutaneous injection 1-5 Units, 1-5 Units, Subcutaneous, TID AC, 3 Units at 10/24/17 0801 **AND** Fingerstick Glucose (POCT), , , TID AC, ZOE De Los Santos    insulin lispro (HumaLOG) 100 units/mL subcutaneous injection 1-5 Units, 1-5 Units, Subcutaneous, HS, ZOE De Los Santos, 2 Units at 10/23/17 2152    lithium carbonate capsule 300 mg, 300 mg, Oral, TID With Meals, Beulah Tilley, CRNP, 300 mg at 10/24/17 0800    pantoprazole (PROTONIX) EC tablet 40 mg, 40 mg, Oral, Daily, Merry Root, CRNP, 40 mg at 10/24/17 0800    risperiDONE (RisperDAL) tablet 2 mg, 2 mg, Oral, BID, Merry Root, CRNP, 2 mg at 10/24/17 0800    OBJECTIVE     Current Weight: Weight - Scale: (!) 154 kg (339 lb 15 2 oz)  Vitals:    10/24/17 0700   BP: 140/81   Pulse: 56   Resp: 16   Temp: (!) 97 2 °F (36 2 °C)   SpO2: 97%       Intake/Output Summary (Last 24 hours) at 10/24/17 1026  Last data filed at 10/24/17 0501   Gross per 24 hour   Intake              650 ml   Output 5500 ml   Net            -4850 ml       PHYSICAL EXAMINATION     Physical Exam:   Eyes:  Pupils equally react to light  ENT:  Moist tongue  Gen:  Not in any acute distress  Neck:   Supple  Chest:   Clear  Cor:  S1-S2 normal  Abdomen:  Soft nontender  Ext:  No swelling  Neuro:  Awake and alert  Skin:  Good skin turgor      LAB RESULTS       Results from last 7 days  Lab Units 10/24/17  0436 10/23/17  2042 10/23/17  1712 10/23/17  0423 10/22/17  2340 10/22/17  1625 10/22/17  1355 10/22/17  0435 10/22/17  0434 10/22/17  0117  10/21/17  0928 10/21/17  0506  10/20/17  2235 10/20/17  1738   WBC Thousand/uL  --   --   --  14 63*  --   --   --  13 27*  --   --   --   --  17 28*  --   --  19 93*   HEMOGLOBIN g/dL  --   --   --  10 9*  --   --   --  10 3*  --   --   --   --  11 8*  --   --  12 8   HEMATOCRIT %  --   --   --  30 8*  --   --   --  28 7*  --   --   --   --  31 8*  --   --  33 7*   PLATELETS Thousands/uL  --   --   --  378  --   --   --  339  --   --   --   --  401*  --  392* 486*   SODIUM mmol/L 135* 135* 134* 136 134* 132* 128*  --  126* 123*  < >  --  118*  119*  < > 117* 116*   POTASSIUM mmol/L 3 9 3 9 4 0 4 2 3 9 4 2 4 1  --  3 6 3 3*  < >  --  3 0*  3 3*  < > 2 3* 2 2*   CHLORIDE mmol/L 102 101 101 103 103 101 98*  --  96* 93*  < >  --  86*  86*  < > 83* 80*   CO2 mmol/L 27 26 25 27 26 25 23  --  23 23  < >  --  21  20*  < > 22 22   BUN mg/dL 9 12 12 13 17 21 27*  --  31* 34*  < >  --  45*  46*  < > 45* 44*   CREATININE mg/dL 1 08 1 14 1 10 1 39* 1 59* 2 11* 2 56*  --  2 97* 3 47*  < >  --  6 67*  6 70*  < > 6 73* 6 50*   CALCIUM mg/dL 8 7 8 6 8 7 8 8 8 7 8 6 8 3  --  8 1* 8 0*  < >  --  8 5  8 5  < > 8 6 9 6   MAGNESIUM mg/dL  --   --  1 6 2 0 2 1 2 2 2 2  --  2 1 2 1  < >  --  1 9  2 0  < > 2 0  --    ALBUMIN g/dL  --   --   --   --   --   --   --   --   --   --   --  2 9*  --   --   --  4 1   TOTAL PROTEIN g/dL  --   --   --   --   --   --   --   --   --   --   --  5 7*  --   --   -- 7 6   GLUCOSE RANDOM mg/dL 184* 274* 182* 160* 150* 164* 140  --  129 124  < >  --  80  77  < > 73 99   < > = values in this interval not displayed  RADIOLOGY RESULTS      Results for orders placed during the hospital encounter of 10/20/17   XR chest 1 view portable    Narrative CHEST  PORTABLE    INDICATION: 44-year-old male, fatigue, nausea, vomiting     COMPARISON:  None    VIEWS:   AP frontal; 1 image    FINDINGS:    The cardiomediastinal silhouette is unremarkable  Oblique opacity at right lung base, subsegmental atelectasis versus infiltrate  Continued follow-up recommended  No pleural effusion  Bony thorax unremarkable  Impression Oblique opacity right lung base, subsegmental atelectasis versus infiltrate       Findings are consistent with emergency room physician's preliminary reading      Workstation performed: [id number]       No results found for this or any previous visit  No results found for this or any previous visit  No results found for this or any previous visit  Results for orders placed during the hospital encounter of 10/20/17   CT abdomen pelvis wo contrast    Narrative CT ABDOMEN AND PELVIS WITHOUT IV CONTRAST    INDICATION:  Abdominal pain  Diarrhea  COMPARISON: None  TECHNIQUE:  CT examination of the abdomen and pelvis was performed without intravenous contrast   Reformatted images were created in axial, sagittal, and coronal planes  Radiation dose length product (DLP) for this visit:  1500 mGy-cm   This examination, like all CT scans performed in the [facility], was performed utilizing techniques to minimize radiation dose exposure, including the use of iterative   reconstruction and automated exposure control  Enteric contrast was administered  FINDINGS:    ABDOMEN    LOWER CHEST:  There is bibasilar subsegmental atelectasis with trace bilateral pleural effusions  LIVER/BILIARY TREE:  Unremarkable      GALLBLADDER: Gallbladder is surgically absent  SPLEEN:  Unremarkable  PANCREAS:  Unremarkable  ADRENAL GLANDS:  There is a 15 mm left adrenal adenoma  The right adrenal gland is unremarkable  KIDNEYS/URETERS:  Unremarkable  No hydronephrosis  STOMACH AND BOWEL:  There are multiple nondilated loops of fluid-filled of small bowel within the lower abdomen and pelvis as well as liquid stool throughout the colon  APPENDIX:  No findings to suggest appendicitis  ABDOMINOPELVIC CAVITY:  No ascites or free intraperitoneal air  No lymphadenopathy  VESSELS:  Unremarkable for patient's age  PELVIS    REPRODUCTIVE ORGANS:  Unremarkable for patient's age  URINARY BLADDER:  Unremarkable  ABDOMINAL WALL/INGUINAL REGIONS:  There are tiny bilateral fat-containing inguinal hernias  OSSEOUS STRUCTURES:  No acute fracture or destructive osseous lesion  Impression Multiple nondilated fluid-filled loops of lower abdominal small bowel with liquid stool noted throughout the colon suggestive of a gastroenteritis/diarrheal disease  There is no significant colonic wall thickening or pericolonic inflammatory stranding  No evidence of large or small bowel obstruction  No free air or free fluid  Small partially visualized bilateral pleural effusions with posterior bibasilar atelectasis  Workstation performed: [id number]       No results found for this or any previous visit  PLAN / RECOMMENDATIONS      Acute kidney injury:  Due to ATN from [place]  Recover quite nicely and diuresing very well    Polyuria: 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 based on urinary study  Discussed with the patient and the family at length    Disposition:  Possible discharge today  Will recheck urine analyses urine osmolarity in blood work this week and will monitor him as outpatient    Advised to follow up with primary doctor also    Tamika Patel MD  Nephrology  10/24/2017        Portions of the record may have been created with voice recognition software  Occasional wrong word or "sound a like" substitutions may have occurred due to the inherent limitations of voice recognition software  Read the chart carefully and recognize, using context, where substitutions have occurred

## 2017-10-24 NOTE — PROGRESS NOTES
[facility] Progress Note  Patient: Kassy Escobar 42 y o  male   MRN: [id number]  PCP: Richie Aguayo MD  Unit/Bed#:  Encounter: [id number]  Date Of Visit: 10/24/17    Assessment:    Principal Problem:    Hyponatremia  Active Problems:    Hypokalemia    Dehydration    Gastroenteritis    Type 2 diabetes mellitus (HCC)    DARIN (acute kidney injury) ([facility] [address] )    Lithium toxicity      Plan: DARIN resolving  Likely due to hypovolemia from nausea, vomiting, and diarrhea versus ATN from lithium toxicity  Nephrology following  Meno Toxicity patient was initially toxic  Did not require dialysis  Now is therapeutic  Discussed with the psychiatry service recommended having patient's home lithium  Was restarted at 300 mg t i d   Schizophrenia continue Celexa, risperidone, and lithium  Nausea/Vomiting/Diarrhea most likely viral gastroenteritis  Initially was put on broad-spectrum antibiotics however all cultures came back negative  Ova and parasites still pending  Now off of antibiotics  Hypovolemic Hyponatremia resolved  Polyuria patient with 4 2 L of urine for 1st half of yesterday  Urine output slowed down overnight  Likely due to post ATN diuresis  Nephrology following  VTE Pharmacologic Prophylaxis:   Pharmacologic: Heparin  Mechanical VTE Prophylaxis in Place: Yes    Patient Centered Rounds: I have performed bedside rounds with nursing staff today  Discussions with Specialists or Other Care Team Provider: Will discuss with Nephrology readiness for discharge    Education and Discussions with Family / Patient:  Importance of fluid restriction    Time Spent for Care: 30 minutes  More than 50% of total time spent on counseling and coordination of care as described above  Current Length of Stay: 4 day(s)    Current Patient Status: Inpatient   Certification Statement: Patient can likely be discharged home today      Discharge Plan:  Home without any services needed    Code Status: Level 1 - Full Code      Subjective:   No events overnight  Objective:     Vitals:   Temp (24hrs), Av 2 °F (36 8 °C), Min:98 2 °F (36 8 °C), Max:98 2 °F (36 8 °C)    HR:  [58-67] 67  Resp:  [16-17] 16  BP: (126-129)/(74-82) 129/74  SpO2:  [97 %] 97 %  Body mass index is 41 38 kg/m²  Input and Output Summary (last 24 hours): Intake/Output Summary (Last 24 hours) at 10/24/17 0601  Last data filed at 10/24/17 0501   Gross per 24 hour   Intake              900 ml   Output             5500 ml   Net            -4600 ml       Physical Exam:     Physical Exam   Constitutional: He is oriented to person, place, and time  He appears well-developed and well-nourished  No distress  HENT:   Head: Normocephalic and atraumatic  Eyes: Pupils are equal, round, and reactive to light  Neck: Normal range of motion  Neck supple  No JVD present  Cardiovascular: Normal rate and regular rhythm  Pulmonary/Chest: Effort normal and breath sounds normal  No respiratory distress  Abdominal: Soft  He exhibits no distension  Musculoskeletal: Normal range of motion  He exhibits no edema  Neurological: He is alert and oriented to person, place, and time  He has normal strength  No cranial nerve deficit or sensory deficit  GCS eye subscore is 4  GCS verbal subscore is 5  GCS motor subscore is 6  Skin: Skin is warm and dry  Nursing note and vitals reviewed        Additional Data:     Labs:      Results from last 7 days  Lab Units 10/23/17  0423   WBC Thousand/uL 14 63*   HEMOGLOBIN g/dL 10 9*   HEMATOCRIT % 30 8*   PLATELETS Thousands/uL 378   NEUTROS PCT % 72   LYMPHS PCT % 16   MONOS PCT % 9   EOS PCT % 2       Results from last 7 days  Lab Units 10/24/17  0436  10/21/17  0928   SODIUM mmol/L 135*  < >  --    POTASSIUM mmol/L 3 9  < >  --    CHLORIDE mmol/L 102  < >  --    CO2 mmol/L 27  < >  --    BUN mg/dL 9  < >  --    CREATININE mg/dL 1 08  < >  --    CALCIUM mg/dL 8 7  < >  --    TOTAL PROTEIN g/dL  --   -- 5 7*   BILIRUBIN TOTAL mg/dL  --   --  0 60   ALK PHOS U/L  --   --  98   ALT U/L  --   --  28   AST U/L  --   --  20   GLUCOSE RANDOM mg/dL 184*  < >  --    < > = values in this interval not displayed  * I Have Reviewed All Lab Data Listed Above  * Additional Pertinent Lab Tests Reviewed: All Labs Within Last 24 Hours Reviewed    Imaging:    Imaging Reports Reviewed Today Include:  Chest x-ray, ultrasound kidney and bladder  Imaging Personally Reviewed by Myself Includes:  Chest x-ray    Recent Cultures (last 7 days):       Results from last 7 days  Lab Units 10/21/17  0612 10/21/17  0228 10/20/17  2104 10/20/17  2103   BLOOD CULTURE   --   --  No Growth at 48 hrs  No Growth at 48 hrs  URINE CULTURE  No Growth <1000 cfu/mL  --   --   --    C DIFF TOXIN B   --  NEGATIVE for C difficle toxin by PCR    --   --        Last 24 Hours Medication List:     atorvastatin 40 mg Oral Daily   citalopram 20 mg Oral Daily   fenofibrate 145 mg Oral Daily   heparin (porcine) 5,000 Units Subcutaneous Q8H [address]   insulin lispro 1-5 Units Subcutaneous TID AC   insulin lispro 1-5 Units Subcutaneous HS   lithium carbonate 300 mg Oral TID With Meals   pantoprazole 40 mg Oral Daily   risperiDONE 2 mg Oral BID        Today, Patient Was Seen By: Brenda Faust PA-C    ** Please Note: Dragon 360 Dictation voice to text software may have been used in the creation of this document   **

## 2017-10-24 NOTE — DISCHARGE SUMMARY
Discharge Summary - Amari Xiao 42 y o  male MRN: [id number]    Unit/Bed#:  Encounter: [id number]    Admission Date: 10/20/2017     Admitting Diagnosis: Dehydration [E86 0]  Hypokalemia [E87 6]  Neck pain [M54 2]  Gastroenteritis [K52 9]  Hyponatremia [E87 1]  DARIN (acute kidney injury) ([facility] [address] ) [N17 9]    HPI: Patient is a 42year old presenting on 10/20 with a complaint of nausea, vomiting, and diarrhea  He has a past medical history of diabetes and schizophrenia on lithium  In the emergency room he was found to have significant electrolyte derangements and lithium toxicity with a level of 1 7 and he was referred to the stepdown unit for admission  Procedures Performed:   Orders Placed This Encounter   Procedures    ED ECG Documentation Only       Hospital Course: He was massively fluid resuscitated and nephrology was consulted for assistance with management of his acute kidney injury  He was aggressively resuscitated and the psychiatry service was consulted due to concern for self-harm  On review of his psychiatric history, he and his family reported that his outpatient provider had stopped checking insulin levels at the same time as titrating his citalopram upwards which can cause an increase in lithium levels  His abnormalities improved with fluid resuscitation and his lithium levels normalized  The inpatient psychiatry service recommended starting his psychiatric medications at half of previous levels  He began to have a large amount of urine output, which was felt to be secondary to diabetes insipidus  He is felt to be stable for discharge with close outpatient follow-up on 10/24      Significant Findings, Care, Treatment and Services Provided:   10/21 Kidney and bladder ultrasound- Negative for hydronephrosis, mildly enlarged bilateral kidneys  10/21 Chest xray- Obliquie opacity right lung base  10/20 CT Abdomen/Pelvis- Multiple nondilated fluid-filled loops of lower abdominal small bowel with liquid stool throughout the colon, small partially visualized bilateral pleural effusions    Complications: None    Discharge Diagnosis:   1  Lithium toxicity  2  Acute kidney injury  3  Type 2 diabetes  4  Dehydration    Condition at Discharge: good     Discharge instructions/Information to patient and family:   See after visit summary for information provided to patient and family  Please follow-up with your primary care provider immediately following discharge to discuss events of recent hospitalization  Please arrange to see the nephrology team as an outpatient in one week  Please follow-up with your endocrinology specialist as needed  Please attend your appointment with Dr Gladis Sylvester on 11/2 at 11:30 AM       Provisions for Follow-Up Care:  See after visit summary for information related to follow-up care and any pertinent home health orders  Disposition: Home    Planned Readmission: No    Discharge Statement   I spent 31 minutes discharging the patient  This time was spent on the day of discharge  I had direct contact with the patient on the day of discharge  Additional documentation is required if more than 30 minutes were spent on discharge  Discharge Medications:  See after visit summary for reconciled discharge medications provided to patient and family

## 2017-10-26 LAB
BACTERIA BLD CULT: NORMAL
BACTERIA BLD CULT: NORMAL
O+P STL CONC: NORMAL

## 2017-11-24 ENCOUNTER — GENERIC CONVERSION - ENCOUNTER (OUTPATIENT)
Dept: OTHER | Facility: OTHER | Age: 47
End: 2017-11-24

## 2018-01-11 ENCOUNTER — ALLSCRIPTS OFFICE VISIT (OUTPATIENT)
Dept: OTHER | Facility: OTHER | Age: 48
End: 2018-01-11

## 2018-01-12 NOTE — PROGRESS NOTES
Assessment   1  DARIN (acute kidney injury) (584 9) (N17 9)   2  Diabetes mellitus (250 00) (E11 9)   3  High blood pressure (401 9) (I10)   4  Bipolar affective disorder (296 80) (F31 9)    Discussion/Summary      Acute kidney injury: Resolved blood work done yesterday she creatinine 1 09 with GFR of more than 60  Advised to continue adequate hydration with medication in frequent monitoring blood test   will see him back on p r n  basis at your request     The patient was counseled regarding diagnostic results,-- instructions for management,-- prognosis  The patient has the current Goals: Keep kidney function normal  The patent has the current Barriers: None  CKD Teaching includes fluid management  Reason For Visit   Danica Gallardo came in today for follow-up from hospital discharge      History of Present Illness   I had seen him in hospital with acute kidney injury due to volume depletion  He has bipolar disorder and on lithium  His creatinine was almost 6 in hospitalization but got much better with hydration  He was supposed to see me last month could not make it came in today  Feeling well no complaint      Review of Systems        Constitutional: No complaints of fever, no chills, no anorexia, no tiredness, no recent weight gain or weight loss  Integumentary: No complaints of skin rash  Gastrointestinal: No complains of abdominal pain, no constipation or diarrhea, no nausea or vomiting  Respiratory: No complaints of shortness of breath, no cough, no productive sputum  Cardiovascular: No complaints of orthopnea, no PND, no chest pain, no palpitations, no lower extremity edema  Musculoskeletal: No complaints of joint pain or swelling  Neurological: No complaints of headache, no lightheadedness or dizziness  Genitourinary: No dysuria, no hematuria, no nocturia, no urinary frequency, no incomplete emptying of bladder, no foamy urine        Eyes: No complaints of eyesight problems or dryness of eyes  ENT: no complaints of hearing loss, no nasal discharge  ROS reviewed  Past Medical History      The active problems and past medical history were reviewed and updated today  Surgical History      The surgical history was reviewed and updated today  Current Meds    1  Atorvastatin Calcium 40 MG Oral Tablet; TAKE 1 TABLET DAILY; Therapy: (Recorded:11Jan2018) to Recorded   2  Citalopram Hydrobromide 20 MG Oral Tablet; TAKE 1 TABLET DAILY; Therapy: (Recorded:11Jan2018) to Recorded   3  Fenofibrate 160 MG Oral Tablet; Take 1 tablet daily; Therapy: (Recorded:11Jan2018) to Recorded   4  GlipiZIDE 10 MG Oral Tablet; take 2 tablet daily; Therapy: (Recorded:11Jan2018) to Recorded   5  Invokana 300 MG Oral Tablet; Take 1 tablet daily; Therapy: (Recorded:11Jan2018) to Recorded   6  Lithium Carbonate 300 MG Oral Tablet; TAKE 1 TABLET 3 TIMES DAILY; Therapy: (Recorded:11Jan2018) to Recorded   7  MetFORMIN HCl - 500 MG Oral Tablet; TAKE 1 TABLET TWICE DAILY WITH FOOD; Therapy: (Recorded:11Jan2018) to Recorded   8  Pantoprazole Sodium 40 MG Oral Tablet Delayed Release; TAKE 1 TABLET DAILY; Therapy: (Recorded:11Jan2018) to Recorded   9  RisperiDONE 2 MG Oral Tablet; TAKE 1 TABLET TWICE DAILY; Therapy: (Recorded:11Jan2018) to Recorded     The medication list was reviewed and updated today  Allergies   1  No Known Drug Allergies    Vitals   Vital Signs    ** Printed in Appendix #1 below  Physical Exam        Constitutional: General appearance: No acute distress, well appearing and well nourished  ENT: External ears and nose appear normal          Eyes: Anicteric sclerae  Neck: No bruit heard over either carotid  JVD:  No JVD present  Pulmonary: Respiratory effort: No increased work of breathing or signs of respiratory distress  -- Auscultation of lungs: Clear to auscultation  Cardiovascular:  Auscultation of heart: Normal rate and rhythm, normal S1 and S2, without murmurs  Abdomen: Non-tender, no masses  Extremities: No cyanosis, clubbing or edema           Signatures    Electronically signed by : NEVILLE Mancera ; 2018  9:39AM EST                       (Author)     Appendix #1          Vital Signs   Patient: Daniela Sanchez; : 1970; MRN: [id number]           Recorded: [id number] 09:28AM Recorded: [id number] 09:11AM Recorded: [id number] 09:09AM   Temperature   98 3 F   Heart Rate 80, Apical     Pulse Quality Normal, Apical     Respiration Quality Normal     Respiration 80     Systolic 983, RUE, Sitting     Diastolic 80, RUE, Sitting     Height  6 ft 4 in    Weight   348 lb    BMI Calculated  42 36    BSA Calculated  2 8

## 2018-01-23 VITALS
HEART RATE: 80 BPM | WEIGHT: 315 LBS | RESPIRATION RATE: 80 BRPM | TEMPERATURE: 98.3 F | HEIGHT: 76 IN | BODY MASS INDEX: 38.36 KG/M2 | DIASTOLIC BLOOD PRESSURE: 80 MMHG | SYSTOLIC BLOOD PRESSURE: 120 MMHG